# Patient Record
Sex: FEMALE | Race: BLACK OR AFRICAN AMERICAN | NOT HISPANIC OR LATINO | Employment: PART TIME | URBAN - METROPOLITAN AREA
[De-identification: names, ages, dates, MRNs, and addresses within clinical notes are randomized per-mention and may not be internally consistent; named-entity substitution may affect disease eponyms.]

---

## 2017-04-02 ENCOUNTER — HOSPITAL ENCOUNTER (EMERGENCY)
Facility: HOSPITAL | Age: 47
Discharge: HOME | End: 2017-04-02
Attending: EMERGENCY MEDICINE
Payer: MEDICAID

## 2017-04-02 VITALS
HEART RATE: 75 BPM | OXYGEN SATURATION: 96 % | SYSTOLIC BLOOD PRESSURE: 137 MMHG | BODY MASS INDEX: 26.46 KG/M2 | HEIGHT: 64 IN | TEMPERATURE: 98 F | RESPIRATION RATE: 18 BRPM | WEIGHT: 155 LBS | DIASTOLIC BLOOD PRESSURE: 75 MMHG

## 2017-04-02 DIAGNOSIS — S69.91XA HAND INJURY, RIGHT, INITIAL ENCOUNTER: Primary | ICD-10-CM

## 2017-04-02 LAB
B-HCG UR QL: NEGATIVE
CTP QC/QA: YES

## 2017-04-02 PROCEDURE — 81025 URINE PREGNANCY TEST: CPT | Performed by: EMERGENCY MEDICINE

## 2017-04-02 PROCEDURE — 63600175 PHARM REV CODE 636 W HCPCS: Performed by: NURSE PRACTITIONER

## 2017-04-02 PROCEDURE — 96372 THER/PROPH/DIAG INJ SC/IM: CPT

## 2017-04-02 PROCEDURE — 99284 EMERGENCY DEPT VISIT MOD MDM: CPT | Mod: 25

## 2017-04-02 RX ORDER — KETOROLAC TROMETHAMINE 30 MG/ML
60 INJECTION, SOLUTION INTRAMUSCULAR; INTRAVENOUS
Status: COMPLETED | OUTPATIENT
Start: 2017-04-02 | End: 2017-04-02

## 2017-04-02 RX ORDER — NAPROXEN 500 MG/1
500 TABLET ORAL 2 TIMES DAILY PRN
Qty: 30 TABLET | Refills: 0 | Status: SHIPPED | OUTPATIENT
Start: 2017-04-02 | End: 2022-04-27

## 2017-04-02 RX ADMIN — KETOROLAC TROMETHAMINE 60 MG: 30 INJECTION, SOLUTION INTRAMUSCULAR at 03:04

## 2017-04-02 NOTE — ED AVS SNAPSHOT
[facility]  [place] [address]               Di Perkins   2017  2:25 PM   ED    Description:  Female : 1970   Department:  [facility]           Your Care was Coordinated By:     Provider Role From To    Melly Calle MD Attending Provider [id number] --    Navi Lyons NP Nurse Practitioner [phone] --      Reason for Visit     Hand Injury           Diagnoses this Visit        Comments    Hand injury, right, initial encounter    -  Primary       ED Disposition     None           To Do List           Follow-up Information     Follow up with [facility] Today.    Specialty:  Emergency Medicine    Why:  If symptoms worsen, As needed    Contact information:    [address] [place] [address] [phone]        Follow up with [facility]. Schedule an appointment as soon as possible for a visit in 1 week.    Specialties:  Orthopedic Surgery, Physical Therapy    Why:  As needed, For further evaluation    Contact information:    [address] [place] [address]  [phone]         These Medications        Disp Refills Start End    naproxen (NAPROSYN) 500 MG tablet 30 tablet 0 2017     Take 1 tablet (500 mg total) by mouth 2 (two) times daily as needed (for pain). - Oral    Pharmacy: [facility] [id number] [place] [address] [address] [facility] [address] Ph #: [phone]         [facility]     [facility] Nurse Care Line - 24/ Assistance  Unless otherwise directed by your provider, please contact [facility], our nurse care line that is available for / assistance.     Registered nurses in the [facility] provide: appointment scheduling, clinical advisement, health education, and other advisory services.  Call: [phone] (toll free)               Medications           START  "taking these NEW medications        Refills    naproxen (NAPROSYN) 500 MG tablet 0    Sig: Take 1 tablet (500 mg total) by mouth 2 (two) times daily as needed (for pain).    Class: Print    Route: Oral      These medications were administered today        Dose Freq    ketorolac injection 60 mg 60 mg ED 1 Time    Sig: Inject 60 mg into the muscle ED 1 Time.    Class: Normal    Route: Intramuscular           Verify that the below list of medications is an accurate representation of the medications you are currently taking.  If none reported, the list may be blank. If incorrect, please contact your healthcare provider. Carry this list with you in case of emergency.           Current Medications     benzonatate (TESSALON) 100 MG capsule Take 1 capsule (100 mg total) by mouth 3 (three) times daily as needed for Cough.    DEXTROMETHORPHAN HBR (VICKS DAYQUIL COUGH ORAL) Take by mouth.      DM/P-EPHED/ACETAMINOPH/DOXYLAM (NYQUIL ORAL) Take by mouth.      naproxen (NAPROSYN) 500 MG tablet Take 1 tablet (500 mg total) by mouth 2 (two) times daily as needed (for pain).           Clinical Reference Information           Your Vitals Were     BP Pulse Temp Resp Height Weight    143/93 (BP Location: Right arm, Patient Position: Sitting) 79 97.9 °F (36.6 °C) (Oral) 17 5' 4" (1.626 m) 70.3 kg (155 lb)    Last Period SpO2 BMI          03/25/2017 99% 26.61 kg/m2        Allergies as of 4/2/2017     No Known Allergies      Immunizations Administered on Date of Encounter - 4/2/2017     None      ED Micro, Lab, POCT     Start Ordered       Status Ordering Provider    04/02/17 1337 04/02/17 1337  POCT urine pregnancy  Once     Comments:  For women of childbearing age w/o hysterectomy    Final result       ED Imaging Orders     Start Ordered       Status Ordering Provider    04/02/17 1338 04/02/17 1337  X-Ray Hand 3 view Right  1 time imaging      Final result         Discharge Instructions       Return to the emergency department for any " new or worsening symptoms.    Follow up with orthopedics in 1 week if pain/swelling does not improve.    Discharge References/Attachments     ACE WRAP (ENGLISH)    NAPROXEN AND NAPROXEN SODIUM ORAL IMMEDIATE-RELEASE TABLETS (ENGLISH)    R.I.C.E. (ENGLISH)      [facility] Sign-Up     Activating your [facility] account is as easy as 1-2-3!     1) Visit [email or web address], select Sign Up Now, enter this activation code and your date of birth, then select Next.  [email or web address]  Expires: 5/17/2017  3:43 PM      2) Create a username and password to use when you visit [facility] in the future and select a security question in case you lose your password and select Next.    3) Enter your e-mail address and click Sign Up!    Additional Information  If you have questions, please e-mail [email or web address] or call [phone] to talk to our [facility] staff. Remember, [facility] is NOT to be used for urgent needs. For medical emergencies, dial 911.          [facility] complies with applicable Federal civil rights laws and does not discriminate on the basis of race, color, national origin, age, disability, or sex.        Language Assistance Services     ATTENTION: Language assistance services are available, free of charge. Please call [phone].      ATENCIÓN: Si habla español, tiene a mena disposición servicios gratuitos de asistencia lingüística. Llame al [phone].     CHÚ Ý: N?u b?n nói Ti?ng Vi?t, có các d?ch v? h? tr? ngôn ng? mi?n phí dành cho b?n. G?i s? [phone].

## 2017-04-02 NOTE — ED PROVIDER NOTES
"Encounter Date: 4/2/2017       History     Chief Complaint   Patient presents with    Hand Injury     "I crushed my hand in my car door today" right.      Review of patient's allergies indicates:  No Known Allergies  HPI Comments: 46 year old female with no pertinent medical Hx c/o right hand pain and swelling after accidentally slamming it in a car door PTA. No obvious deformity or laceration. Pt has not taken any pain medications prior to arrival.    The history is provided by the patient.     History reviewed. No pertinent past medical history.  Past Surgical History:   Procedure Laterality Date    UTERINE FIBROID SURGERY       Family History   Problem Relation Age of Onset    Diabetes Mother      Social History   Substance Use Topics    Smoking status: Never Smoker    Smokeless tobacco: None    Alcohol use None     Review of Systems   Constitutional: Negative for chills and fever.   HENT: Negative for congestion, rhinorrhea, sneezing and sore throat.    Eyes: Negative for pain and discharge.   Respiratory: Negative for apnea, cough, choking, chest tightness, shortness of breath, wheezing and stridor.    Cardiovascular: Negative for chest pain, palpitations and leg swelling.   Gastrointestinal: Negative for abdominal pain, diarrhea, nausea and vomiting.   Genitourinary: Negative for dysuria and hematuria.   Musculoskeletal: Positive for arthralgias and myalgias. Negative for back pain and neck pain.   Neurological: Negative for dizziness, seizures and syncope.   Hematological: Negative for adenopathy.   Psychiatric/Behavioral: Negative for agitation. The patient is not nervous/anxious.        Physical Exam   Initial Vitals   BP Pulse Resp Temp SpO2   04/02/17 1328 04/02/17 1328 04/02/17 1328 04/02/17 1328 04/02/17 1328   143/93 79 17 97.9 °F (36.6 °C) 99 %     Physical Exam    Nursing note and vitals reviewed.  Constitutional: She appears well-developed and well-nourished. She is not diaphoretic. No " distress.   HENT:   Head: Normocephalic and atraumatic.   Right Ear: External ear normal.   Left Ear: External ear normal.   Nose: Nose normal.   Eyes: Conjunctivae and EOM are normal. Pupils are equal, round, and reactive to light. Right eye exhibits no discharge. Left eye exhibits no discharge. No scleral icterus.   Neck: Normal range of motion. Neck supple. No thyromegaly present. No tracheal deviation present. No JVD present.   Cardiovascular: Normal rate, regular rhythm, normal heart sounds and intact distal pulses. Exam reveals no gallop and no friction rub.    No murmur heard.  Pulmonary/Chest: Breath sounds normal. No stridor. No respiratory distress. She has no wheezes. She has no rhonchi. She has no rales. She exhibits no tenderness.   Abdominal: Soft. Bowel sounds are normal. She exhibits no distension and no mass. There is no tenderness. There is no rebound and no guarding.   Musculoskeletal: She exhibits edema and tenderness.        Right hand: She exhibits decreased range of motion (right thumb secondary to pain), tenderness (right thenar eminence) and swelling (right thenar eminence). She exhibits normal two-point discrimination, normal capillary refill, no deformity and no laceration. Normal sensation noted.   Lymphadenopathy:     She has no cervical adenopathy.   Neurological: She is alert and oriented to person, place, and time. She has normal strength and normal reflexes. She displays normal reflexes. No cranial nerve deficit or sensory deficit.   Skin: Skin is warm and dry. No rash and no abscess noted. No erythema. No pallor.   Psychiatric: She has a normal mood and affect. Her behavior is normal. Judgment and thought content normal.         ED Course   Procedures  Labs Reviewed   POCT URINE PREGNANCY             Medical Decision Making:   ED Management:  This is a 46 year old female with no pertinent medial Hx presenting with right hand pain and swelling secondary to accidentally closing the  hand in her car door prior to arrival today. Pt has not taken any medications for pain control. On exam there is no laceration or deformity. All five digits of the right hand are neurovascularly in tact. There is swelling to right thenar eminence. ROM of right thumb limited secondary to pain. Xray of the right hand reviewed by me shows no acute fracture or dislocation. I suspect right hand injury. I considered but doubt facture, dislocation. ACE wrap applied and Toradol IM administered in the ED. Pt discharged with prescription for Naproxen and instructions to follow up with  Ortho. Pt discharged home with instructions for follow up and supportive care. ED return precautions given. Pt verbalized understanding of all information and instructions given. This case was discussed with Melly Calle MD who agreed with the assessment and plan.    Other:   I have discussed this case with another health care provider.                   ED Course     Clinical Impression:   The encounter diagnosis was Hand injury, right, initial encounter.    Disposition:   Disposition: Discharged  Condition: Stable       Navi Lyons NP  04/02/17 6048

## 2017-04-02 NOTE — DISCHARGE INSTRUCTIONS
Return to the emergency department for any new or worsening symptoms.    Follow up with orthopedics in 1 week if pain/swelling does not improve.

## 2018-01-04 ENCOUNTER — OFFICE VISIT (OUTPATIENT)
Dept: OBSTETRICS AND GYNECOLOGY | Facility: CLINIC | Age: 48
End: 2018-01-04
Payer: MEDICAID

## 2018-01-04 ENCOUNTER — LAB VISIT (OUTPATIENT)
Dept: LAB | Facility: HOSPITAL | Age: 48
End: 2018-01-04
Attending: OBSTETRICS & GYNECOLOGY
Payer: MEDICAID

## 2018-01-04 VITALS
HEIGHT: 64 IN | SYSTOLIC BLOOD PRESSURE: 112 MMHG | DIASTOLIC BLOOD PRESSURE: 64 MMHG | WEIGHT: 172.94 LBS | BODY MASS INDEX: 29.52 KG/M2

## 2018-01-04 DIAGNOSIS — N93.9 ABNORMAL UTERINE BLEEDING (AUB): ICD-10-CM

## 2018-01-04 DIAGNOSIS — Z12.31 ENCOUNTER FOR SCREENING MAMMOGRAM FOR BREAST CANCER: ICD-10-CM

## 2018-01-04 DIAGNOSIS — Z01.419 ENCOUNTER FOR WELL WOMAN EXAM WITH ROUTINE GYNECOLOGICAL EXAM: Primary | ICD-10-CM

## 2018-01-04 DIAGNOSIS — Z12.4 ENCOUNTER FOR PAPANICOLAOU SMEAR FOR CERVICAL CANCER SCREENING: ICD-10-CM

## 2018-01-04 LAB
BASOPHILS # BLD AUTO: 0.01 K/UL
BASOPHILS NFR BLD: 0.2 %
DIFFERENTIAL METHOD: ABNORMAL
EOSINOPHIL # BLD AUTO: 0.2 K/UL
EOSINOPHIL NFR BLD: 5.3 %
ERYTHROCYTE [DISTWIDTH] IN BLOOD BY AUTOMATED COUNT: 13.5 %
FSH SERPL-ACNC: 47.5 MIU/ML
HCG INTACT+B SERPL-ACNC: <1.2 MIU/ML
HCT VFR BLD AUTO: 37.4 %
HGB BLD-MCNC: 12 G/DL
LH SERPL-ACNC: 25.3 MIU/ML
LYMPHOCYTES # BLD AUTO: 1.9 K/UL
LYMPHOCYTES NFR BLD: 41.4 %
MCH RBC QN AUTO: 27.6 PG
MCHC RBC AUTO-ENTMCNC: 32.1 G/DL
MCV RBC AUTO: 86 FL
MONOCYTES # BLD AUTO: 0.2 K/UL
MONOCYTES NFR BLD: 5.1 %
NEUTROPHILS # BLD AUTO: 2.2 K/UL
NEUTROPHILS NFR BLD: 47.8 %
PLATELET # BLD AUTO: 217 K/UL
PMV BLD AUTO: 10 FL
PROLACTIN SERPL IA-MCNC: 7.6 NG/ML
RBC # BLD AUTO: 4.35 M/UL
T4 FREE SERPL-MCNC: 1.03 NG/DL
TSH SERPL DL<=0.005 MIU/L-ACNC: 1.44 UIU/ML
WBC # BLD AUTO: 4.52 K/UL

## 2018-01-04 PROCEDURE — 88175 CYTOPATH C/V AUTO FLUID REDO: CPT

## 2018-01-04 PROCEDURE — 87624 HPV HI-RISK TYP POOLED RSLT: CPT

## 2018-01-04 PROCEDURE — 84702 CHORIONIC GONADOTROPIN TEST: CPT

## 2018-01-04 PROCEDURE — 99202 OFFICE O/P NEW SF 15 MIN: CPT | Mod: 25,S$PBB,, | Performed by: OBSTETRICS & GYNECOLOGY

## 2018-01-04 PROCEDURE — 36415 COLL VENOUS BLD VENIPUNCTURE: CPT

## 2018-01-04 PROCEDURE — 99213 OFFICE O/P EST LOW 20 MIN: CPT | Mod: PBBFAC | Performed by: OBSTETRICS & GYNECOLOGY

## 2018-01-04 PROCEDURE — 83001 ASSAY OF GONADOTROPIN (FSH): CPT

## 2018-01-04 PROCEDURE — 99999 PR PBB SHADOW E&M-EST. PATIENT-LVL III: CPT | Mod: PBBFAC,,, | Performed by: OBSTETRICS & GYNECOLOGY

## 2018-01-04 PROCEDURE — 84439 ASSAY OF FREE THYROXINE: CPT

## 2018-01-04 PROCEDURE — 83002 ASSAY OF GONADOTROPIN (LH): CPT

## 2018-01-04 PROCEDURE — 84146 ASSAY OF PROLACTIN: CPT

## 2018-01-04 PROCEDURE — 85025 COMPLETE CBC W/AUTO DIFF WBC: CPT

## 2018-01-04 PROCEDURE — 84443 ASSAY THYROID STIM HORMONE: CPT

## 2018-01-04 PROCEDURE — 99386 PREV VISIT NEW AGE 40-64: CPT | Mod: S$PBB,,, | Performed by: OBSTETRICS & GYNECOLOGY

## 2018-01-04 NOTE — PROGRESS NOTES
SUBJECTIVE:   47 y.o. female   for AUB and WWE    SUBJECTIVE:   Di Perkins is a 47 y.o. female   for annual well woman exam. Patient's last menstrual period was 2017 (exact date)..  She complains of aub, see note below.      Contraception: none    History reviewed. No pertinent past medical history.  Past Surgical History:   Procedure Laterality Date    UTERINE FIBROID SURGERY       Social History     Social History    Marital status:      Spouse name: N/A    Number of children: N/A    Years of education: N/A     Occupational History    Not on file.     Social History Main Topics    Smoking status: Never Smoker    Smokeless tobacco: Never Used    Alcohol use No    Drug use: No    Sexual activity: Yes     Partners: Male     Birth control/ protection: None     Other Topics Concern    Not on file     Social History Narrative    No narrative on file     Family History   Problem Relation Age of Onset    Diabetes Mother      OB History    Para Term  AB Living   3 1   1   2   SAB TAB Ectopic Multiple Live Births                  # Outcome Date GA Lbr Erwin/2nd Weight Sex Delivery Anes PTL Lv   3             2             1                Obstetric Comments   Gynhx: reg/5. Not heavy. Mild cramp   Sexually active but not using protection   H/o myomectomy in    H/o abnl pap, did not require treatment. Last pap 2015 neg   Denies STD         Current Outpatient Prescriptions   Medication Sig Dispense Refill    benzonatate (TESSALON) 100 MG capsule Take 1 capsule (100 mg total) by mouth 3 (three) times daily as needed for Cough. 20 capsule 0    DEXTROMETHORPHAN HBR (VICKS DAYQUIL COUGH ORAL) Take by mouth.        DM/P-EPHED/ACETAMINOPH/DOXYLAM (NYQUIL ORAL) Take by mouth.        naproxen (NAPROSYN) 500 MG tablet Take 1 tablet (500 mg total) by mouth 2 (two) times daily as needed (for pain). 30 tablet 0     No current facility-administered  "medications for this visit.      Allergies: Patient has no known allergies.       ROS:  GENERAL: Denies weight gain or weight loss. Feeling well overall.   SKIN: Denies rash or lesions.   HEAD: Denies head injury or headache.   NODES: Denies enlarged lymph nodes.   CHEST: Denies chest pain or shortness of breath.   CARDIOVASCULAR: Denies palpitations or left sided chest pain.   ABDOMEN: No abdominal pain, constipation, diarrhea, nausea, vomiting or rectal bleeding.   URINARY: No frequency, dysuria, hematuria, or burning on urination.  REPRODUCTIVE: menstrual irregularities  BREASTS: The patient performs breast self-examination and denies pain, lumps, or nipple discharge.   HEMATOLOGIC: No easy bruisability or excessive bleeding.  MUSCULOSKELETAL: Denies joint pain or swelling.   NEUROLOGIC: Denies syncope or weakness.   PSYCHIATRIC: Denies depression, anxiety or mood swings.      OBJECTIVE:   /64   Ht 5' 4" (1.626 m)   Wt 78.4 kg (172 lb 15.2 oz)   LMP 12/18/2017 (Exact Date)   BMI 29.69 kg/m²   The patient appears well, alert, oriented x 3, in no distress.  NECK: negative, no thyromegaly, trachea midline  SKIN: normal, good color, good turgor and no acne, striae, hirsutism  BREAST EXAM: breasts appear normal, no suspicious masses, no skin or nipple changes or axillary nodes  ABDOMEN: soft, non-tender; bowel sounds normal; no masses,  no organomegaly and no hernias, masses, or hepatosplenomegaly  GENITALIA: normal external genitalia, no erythema, no discharge  URETHRA: normal appearing urethra with no masses, tenderness or lesions and normal urethra, normal urethral meatus  VAGINA: Normal  CERVIX: no cervical motion tenderness. Nabothian cyst noted at 12 oclock.  Moderate bleeding noted with pap smear  UTERUS: enlarged, 10 weeks size and regular contour  ADNEXA: no mass, fullness, tenderness      ASSESSMENT:   1. Health maintenance  -pap done. Discussed ASCCP guideline screening every 3 - 5 years. " "  -screening MMG ordered  -counseled on exercise and healthy diet, weight loss  -bone health:  Discussed Vitamin D and Calcium supplementation, weight bearing exercises      Progress NOTE    Patient's last menstrual period was 2017 (exact date)..      Her normal cycles are monthly and lasting 5 days. Normal flow, not heavy.  Pt reported LMP on 17, heavy for the first 3 days were heavier than usual. Changed 8 pads in a day.  Went through a whole box of pads in 3 days.  She then continues to vaginal spotting everyday until yesterday.  + discomfort in the abdomen as if something is pulling.  Never had this problem before.  Never had any IMB before this episode   Denies postcoital bleeding     H/o uterine fibroid, s/p myomectomy in  before her pregnancies    OB History    Para Term  AB Living   3 1   1   2   SAB TAB Ectopic Multiple Live Births                  # Outcome Date GA Lbr Erwin/2nd Weight Sex Delivery Anes PTL Lv   3             2             1                Obstetric Comments   Gynhx: reg/5. Not heavy. Mild cramp   Sexually active but not using protection   H/o myomectomy in    H/o abnl pap, did not require treatment. Last pap 2015 neg   Denies STD       OBJECTIVE:   /64   Ht 5' 4" (1.626 m)   Wt 78.4 kg (172 lb 15.2 oz)   LMP 2017 (Exact Date)   BMI 29.69 kg/m²   The patient appears well, alert, oriented x 3, in no distress.  See exam as above    ASSESSMENT:   1.  AUB:  Check TSH, LH, FSH, UPT, Prolactin and pelvic US.  Discussed with patient different etiology of abnormal uterine bleeding and different management approaches depend on the causes.  Patient will need to return for an EMB.   2. H/o fibroid:  Discussed with pt this could be the cause of her AUB as fibroid can recurs.  3.  RTC in one week for EMB  "

## 2018-01-09 LAB
HPV16 AG SPEC QL: NEGATIVE
HPV16+18+H RISK 12 DNA CVX-IMP: NEGATIVE
HPV18 DNA SPEC QL NAA+PROBE: NEGATIVE

## 2018-01-10 ENCOUNTER — HOSPITAL ENCOUNTER (OUTPATIENT)
Dept: RADIOLOGY | Facility: HOSPITAL | Age: 48
Discharge: HOME | End: 2018-01-10
Attending: OBSTETRICS & GYNECOLOGY
Payer: MEDICAID

## 2018-01-10 ENCOUNTER — PROCEDURE VISIT (OUTPATIENT)
Dept: OBSTETRICS AND GYNECOLOGY | Facility: CLINIC | Age: 48
End: 2018-01-10
Payer: MEDICAID

## 2018-01-10 VITALS
BODY MASS INDEX: 29.84 KG/M2 | HEIGHT: 64 IN | WEIGHT: 174.81 LBS | SYSTOLIC BLOOD PRESSURE: 110 MMHG | DIASTOLIC BLOOD PRESSURE: 64 MMHG

## 2018-01-10 DIAGNOSIS — N93.9 ABNORMAL UTERINE BLEEDING (AUB): ICD-10-CM

## 2018-01-10 DIAGNOSIS — N93.9 ABNORMAL UTERINE BLEEDING (AUB): Primary | ICD-10-CM

## 2018-01-10 PROBLEM — D25.9 UTERINE FIBROID: Status: ACTIVE | Noted: 2018-01-10

## 2018-01-10 LAB
B-HCG UR QL: NEGATIVE
CTP QC/QA: YES

## 2018-01-10 PROCEDURE — 76830 TRANSVAGINAL US NON-OB: CPT | Mod: TC

## 2018-01-10 PROCEDURE — 76856 US EXAM PELVIC COMPLETE: CPT | Mod: 26,,, | Performed by: RADIOLOGY

## 2018-01-10 PROCEDURE — 99499 UNLISTED E&M SERVICE: CPT | Mod: S$PBB,,, | Performed by: OBSTETRICS & GYNECOLOGY

## 2018-01-10 PROCEDURE — 76830 TRANSVAGINAL US NON-OB: CPT | Mod: 26,,, | Performed by: RADIOLOGY

## 2018-01-10 PROCEDURE — 88305 TISSUE EXAM BY PATHOLOGIST: CPT | Mod: 26,,, | Performed by: PATHOLOGY

## 2018-01-10 PROCEDURE — 81025 URINE PREGNANCY TEST: CPT | Mod: PBBFAC | Performed by: OBSTETRICS & GYNECOLOGY

## 2018-01-10 PROCEDURE — 88305 TISSUE EXAM BY PATHOLOGIST: CPT | Performed by: PATHOLOGY

## 2018-01-10 PROCEDURE — 58100 BIOPSY OF UTERUS LINING: CPT | Mod: S$PBB,,, | Performed by: OBSTETRICS & GYNECOLOGY

## 2018-01-10 PROCEDURE — 58100 BIOPSY OF UTERUS LINING: CPT | Mod: PBBFAC | Performed by: OBSTETRICS & GYNECOLOGY

## 2018-01-10 NOTE — PROGRESS NOTES
Please inform pt of normal pap.  Pelvic US shows at least 4 fibroids. Largest 4 cm.  Will await EMBx result for further treatment plan.

## 2018-01-10 NOTE — PROCEDURES
Procedures     CC: ENDOMETRIAL BIOPSPY    Di Perkins is a 47 y.o. female  presents for an endometrial biopsy secondary to AUB-prolonged menstrual bleeding.  UPT is Negative.      Pelvic US : not done, scheduled for today    PRE-ENDOMETRIAL BIOPSY COUNSELING:    The patient was informed of the risk of bleeding, infection, uterine perforation and pain and that the test will rule-out endometrial cancer with accuracy greater than 95%.  She was counseled on the alternatives to endometrial biopsy and agrees to proceed.      TIME OUT PERFORMED.    The cervix was visualized with a speculum.    The cervix was prepped with iodine.    A single tooth tenaculum was placed on the anterior lip prior to the biopsy.      A sterile endometrial pipelle was passed without difficulty to a depth of 10 cm.    Endometrial tissue was obtained.      The specimen was placed in formalyn and sent to Pathology of histology evaluation.    The patient tolerated the procedure well.      ASSESSMENT AND PLAN  AUB:  Pelvic US pending.  Will f/u on EMB result  FSH is high - likely due to perimenopausal transition  Will call pt for result    POST ENDOMETRIAL BIOPSY COUNSELING:  Manage post biopsy cramping with NSAIDs or Tylenol.  Expect spotting or light bleeding for a few days.  Report bleeding heavier than a period, fever > 101.0 F, worsening pain or a foul smelling vaginal discharge.      Counseling lasted approximately 15 minutes and all her questions were answered.      FOLLOW-UP:  Pending on result

## 2018-01-12 ENCOUNTER — TELEPHONE (OUTPATIENT)
Dept: OBSTETRICS AND GYNECOLOGY | Facility: CLINIC | Age: 48
End: 2018-01-12

## 2018-01-12 NOTE — TELEPHONE ENCOUNTER
----- Message from Mercedes Michelle MD sent at 1/12/2018  2:15 PM CST -----  Called pt to discuss US result as well as EMB result.  VM not set up on mobile phone.  Message left on home phone for pt to call back.  Please try to call pt again.      To discuss:  Negative EMB result  Pelvic US with 4 fibroids  Will discuss treatment plan.

## 2018-01-12 NOTE — TELEPHONE ENCOUNTER
Called and spoke with pt regarding results of ultrasound and EMBX.  An appt made for pt to come in on 01/15/2018.

## 2018-01-15 ENCOUNTER — OFFICE VISIT (OUTPATIENT)
Dept: OBSTETRICS AND GYNECOLOGY | Facility: CLINIC | Age: 48
End: 2018-01-15
Payer: MEDICAID

## 2018-01-15 VITALS
SYSTOLIC BLOOD PRESSURE: 116 MMHG | DIASTOLIC BLOOD PRESSURE: 60 MMHG | BODY MASS INDEX: 28.22 KG/M2 | HEIGHT: 66 IN | WEIGHT: 175.63 LBS

## 2018-01-15 DIAGNOSIS — D25.9 UTERINE LEIOMYOMA, UNSPECIFIED LOCATION: Primary | ICD-10-CM

## 2018-01-15 DIAGNOSIS — D68.51 FACTOR 5 LEIDEN MUTATION, HETEROZYGOUS: ICD-10-CM

## 2018-01-15 PROCEDURE — 99999 PR PBB SHADOW E&M-EST. PATIENT-LVL III: CPT | Mod: PBBFAC,,, | Performed by: OBSTETRICS & GYNECOLOGY

## 2018-01-15 PROCEDURE — 99213 OFFICE O/P EST LOW 20 MIN: CPT | Mod: PBBFAC | Performed by: OBSTETRICS & GYNECOLOGY

## 2018-01-15 PROCEDURE — 99213 OFFICE O/P EST LOW 20 MIN: CPT | Mod: S$PBB,,, | Performed by: OBSTETRICS & GYNECOLOGY

## 2018-01-15 NOTE — PROGRESS NOTES
SUBJECTIVE:   47 y.o. female   for result discussion    Patient's last menstrual period was 2017 (exact date)..    Pt had workup for heavy prolonged menstrual bleeding.  EMB was negative.  FSH elevated and pelvic US shows uterine fibroid       EMB 2018  Specimen submitted as endometrium biopsy:  -Fragments of very weakly proliferative endometrium exhibiting slight glandular-stromal dyssynchrony  -Glands appear evenly spaced and are free of hyperplasia and atypia  -Clinical correlation is necessary for complete interpretation.    Time of Procedure: 01/10/18 15:34:55  Accession # [id number]    Technique: Transabdominal and transvaginal ultrasound of the pelvis with color flow Doppler evaluation of the ovaries.    Comparison: None.    Clinical indication:  Abnormal uterine bleeding.    Findings:    Uterus measures 7.0 x 6.4 x 7.3cm with at least 4 uterine fibroids.  Uterine fibroid in the anterior fundus measuring 3.6 x 3.3 x 3.1 CM.  Uterine fibroid in the mid superior body measuring 1.6 x 1.0 x 1.6 CM.  Uterine fibroid and the mid superior uterine body measuring 1.8 x 2.2 x 1.5 CM.  Uterine fibroid in the anterior uterine body measuring 3.2 x 3.3 x 3.1 CM.  The endometrial stripe is not thickened in this pre-menopausal patient, measuring 12mm.    The ovaries are normal in size and appearance.    Right ovary measures 2.0 x 1.8 x 1.9cm.  There is normal vascular flow.    Left ovary measures 2.1 x 2.3 x 2.1cm.  There is normal vascular flow.    There is no evidence of free fluid within the adnexa, likely physiologic.   Impression       Uterine fibroids as described above.  Electronically signed by: EVER CAMPBELL MD  Date: 01/10/18  Time: 16:25          OB History    Para Term  AB Living   3 1   1   2   SAB TAB Ectopic Multiple Live Births                  # Outcome Date GA Lbr Erwin/2nd Weight Sex Delivery Anes PTL Lv   3             2             1                 Obstetric Comments   Gynhx: reg/5. Not heavy. Mild cramp   Sexually active but not using protection   H/o myomectomy in 2000   H/o abnl pap, did not require treatment. Last pap 8/2015 neg   Denies STD     Past Medical History:   Diagnosis Date    Deep vein thrombosis 2002    left forearm, due to Factor 5 Leiden    Factor 5 Leiden mutation, heterozygous      Past Surgical History:   Procedure Laterality Date    UTERINE FIBROID SURGERY       Social History     Social History    Marital status:      Spouse name: N/A    Number of children: N/A    Years of education: N/A     Occupational History    Not on file.     Social History Main Topics    Smoking status: Never Smoker    Smokeless tobacco: Never Used    Alcohol use No    Drug use: No    Sexual activity: Yes     Partners: Male     Birth control/ protection: None     Other Topics Concern    Not on file     Social History Narrative    No narrative on file     Family History   Problem Relation Age of Onset    Diabetes Mother          Current Outpatient Prescriptions   Medication Sig Dispense Refill    benzonatate (TESSALON) 100 MG capsule Take 1 capsule (100 mg total) by mouth 3 (three) times daily as needed for Cough. 20 capsule 0    DEXTROMETHORPHAN HBR (VICKS DAYQUIL COUGH ORAL) Take by mouth.        DM/P-EPHED/ACETAMINOPH/DOXYLAM (NYQUIL ORAL) Take by mouth.        naproxen (NAPROSYN) 500 MG tablet Take 1 tablet (500 mg total) by mouth 2 (two) times daily as needed (for pain). 30 tablet 0     No current facility-administered medications for this visit.      Allergies: Patient has no known allergies.       ROS:  GENERAL: Denies weight gain or weight loss. Feeling well overall.   SKIN: Denies rash or lesions.   HEAD: Denies head injury or headache.   NODES: Denies enlarged lymph nodes.   CHEST: Denies chest pain or shortness of breath.   CARDIOVASCULAR: Denies palpitations or left sided chest pain.   ABDOMEN: No abdominal pain,  "constipation, diarrhea, nausea, vomiting or rectal bleeding.   URINARY: No frequency, dysuria, hematuria, or burning on urination.  REPRODUCTIVE: Denies vaginal discharge, abnormal vaginal bleeding, lesions, pelvic pain  BREASTS: The patient performs breast self-examination and denies pain, lumps, or nipple discharge.   HEMATOLOGIC: No easy bruisability or excessive bleeding.  MUSCULOSKELETAL: Denies joint pain or swelling.   NEUROLOGIC: Denies syncope or weakness.   PSYCHIATRIC: Denies depression, anxiety or mood swings.    OBJECTIVE:   /60   Ht 5' 6" (1.676 m)   Wt 79.6 kg (175 lb 9.5 oz)   LMP 12/18/2017 (Exact Date)   BMI 28.34 kg/m²   The patient appears well, alert, oriented x 3, in no distress.    Counseling appt only    ASSESSMENT:   1.  AUB-O/L:  Due to ovulatory dysfunction as well as uterine fibroid.  Pt with h/o DVT due to Factor 5 leiden.  She is not a candidate for SHANELLE due to F5L deficiency.  Discussed with pt that progesterone only medication such as mirena IUD, nexplanon, mini-ill or depo provera.  Pt would like to monitor her bleeding for now.  Advised pt to take motrin 600 mg q 6 hours around her cycle for decrease menstrual flow as well as dysmenorrhea.  All questions were answered to her satisfaction.    Total time spent with patient 15 minutes, with >50% spent on counseling and coordinating of care.     "

## 2018-05-16 ENCOUNTER — HOSPITAL ENCOUNTER (EMERGENCY)
Facility: HOSPITAL | Age: 48
Discharge: HOME | End: 2018-05-16
Attending: EMERGENCY MEDICINE
Payer: MEDICAID

## 2018-05-16 VITALS
DIASTOLIC BLOOD PRESSURE: 75 MMHG | TEMPERATURE: 98 F | RESPIRATION RATE: 18 BRPM | SYSTOLIC BLOOD PRESSURE: 117 MMHG | HEIGHT: 65 IN | BODY MASS INDEX: 27.49 KG/M2 | WEIGHT: 165 LBS | OXYGEN SATURATION: 97 % | HEART RATE: 72 BPM

## 2018-05-16 DIAGNOSIS — M25.461 EFFUSION, RIGHT KNEE: Primary | ICD-10-CM

## 2018-05-16 DIAGNOSIS — M25.569 KNEE PAIN: ICD-10-CM

## 2018-05-16 DIAGNOSIS — M79.606 LEG PAIN: ICD-10-CM

## 2018-05-16 LAB
B-HCG UR QL: NEGATIVE
CTP QC/QA: YES

## 2018-05-16 PROCEDURE — 25000003 PHARM REV CODE 250: Performed by: PHYSICIAN ASSISTANT

## 2018-05-16 PROCEDURE — 81025 URINE PREGNANCY TEST: CPT | Performed by: NURSE PRACTITIONER

## 2018-05-16 PROCEDURE — 99284 EMERGENCY DEPT VISIT MOD MDM: CPT | Mod: 25

## 2018-05-16 RX ORDER — NAPROXEN 500 MG/1
500 TABLET ORAL
Status: COMPLETED | OUTPATIENT
Start: 2018-05-16 | End: 2018-05-16

## 2018-05-16 RX ADMIN — NAPROXEN 500 MG: 500 TABLET ORAL at 02:05

## 2018-05-16 NOTE — DISCHARGE INSTRUCTIONS
Please rest, apply ice intermittently, compress with ace wrap and elevate your leg as much as possible.    Avoid strenuous activity.    You can take Naproxen as needed for pain.    Please make a follow-up appointment with an orthopedist if your symptoms continue.    Return to the ER for any new or concerning symptoms.

## 2018-05-16 NOTE — ED PROVIDER NOTES
Encounter Date: 5/16/2018    This is a SORT/MSE of a 47 y.o. female presenting to the ED with c/o knee pain and swelling - right knee. Also reports swelling to the calf. Pain with ambulation. No injuries. No TTP of the popliteal fossa or calf. Takes ASA daily. Care will be transferred to an alternate provider when patient is roomed for a full evaluation and final disposition. SHEBA Castellanos, FNP-C    SCRIBE #1 NOTE: Mo BLANCO am scribing for, and in the presence of,  Danette Gonzalez PA-C. I have scribed the following portions of the note - Other sections scribed: HPI, ROS.       History     Chief Complaint   Patient presents with    Joint Swelling     right knee swelling that started sunday. Initially started at the end of april. Has pain in the back of calf     CC: Knee Pain and Swelling    46 y/o female with DVT of L forearm presents to the ED c/o acute onset R sided knee swelling and pain that radiates down the posterior aspect of R sided calf to R sided ankle that started x3 days ago. The patient reports similar symptoms x2 wks ago that resolved on its own. The pain is severe (8/10). Ambulation and extension of R leg worsen the pain. The patient attempted Ibuprofen (last dose yesterday) and aspirin (last dose x3 days ago) with no relief. The patient denies any recent injury or trauma. The patient denies hx of knee injury. The patient denies numbness/tingling, fever, or cough. No other symptoms reported.      The history is provided by the patient. No  was used.     Review of patient's allergies indicates:  No Known Allergies  Past Medical History:   Diagnosis Date    Deep vein thrombosis 2002    left forearm, due to Factor 5 Leiden    Factor 5 Leiden mutation, heterozygous      Past Surgical History:   Procedure Laterality Date    UTERINE FIBROID SURGERY       Family History   Problem Relation Age of Onset    Diabetes Mother      Social History   Substance Use Topics     Smoking status: Never Smoker    Smokeless tobacco: Never Used    Alcohol use No     Review of Systems   Constitutional: Negative for chills, diaphoresis, fatigue and fever.   HENT: Negative for congestion, ear pain, rhinorrhea and sore throat.    Eyes: Negative for pain and redness.   Respiratory: Negative for cough and shortness of breath.    Cardiovascular: Negative for chest pain.   Gastrointestinal: Negative for abdominal pain, constipation, diarrhea, nausea and vomiting.   Genitourinary: Negative for decreased urine volume, difficulty urinating, dysuria, frequency, hematuria and urgency.   Musculoskeletal: Positive for arthralgias (right knee pain). Negative for back pain, joint swelling and neck pain.        (+) R sided knee swelling and pain that radiates down the posterior aspect of R sided calf to R side ankle   Skin: Negative for rash.   Neurological: Negative for numbness (or tingling) and headaches.   Psychiatric/Behavioral: Negative for confusion. The patient is not nervous/anxious.        Physical Exam     Initial Vitals [05/16/18 1134]   BP Pulse Resp Temp SpO2   136/78 83 18 98.3 °F (36.8 °C) 98 %      MAP       97.33         Physical Exam    Nursing note and vitals reviewed.  Constitutional: Vital signs are normal. She appears well-developed and well-nourished. She is not diaphoretic. She is cooperative.  Non-toxic appearance. She does not have a sickly appearance. She does not appear ill. No distress.   HENT:   Head: Normocephalic and atraumatic.   Right Ear: Tympanic membrane, external ear and ear canal normal.   Left Ear: Tympanic membrane, external ear and ear canal normal.   Nose: Nose normal.   Mouth/Throat: Uvula is midline, oropharynx is clear and moist and mucous membranes are normal. No oropharyngeal exudate.   Eyes: Conjunctivae, EOM and lids are normal. Pupils are equal, round, and reactive to light.   Neck: Trachea normal, normal range of motion, full passive range of motion without  pain and phonation normal. Neck supple.   Cardiovascular: Normal rate, regular rhythm, normal heart sounds and intact distal pulses. Exam reveals no gallop and no friction rub.    No murmur heard.  Pulses:       Popliteal pulses are 2+ on the right side, and 2+ on the left side.   Capillary refill less than 2 sec in bilateral lower extremities.   Pulmonary/Chest: Effort normal and breath sounds normal. No respiratory distress. She has no decreased breath sounds. She has no wheezes. She has no rhonchi. She has no rales.   Abdominal: Soft. Normal appearance and bowel sounds are normal. She exhibits no distension and no mass. There is no tenderness. There is no rigidity, no rebound and no guarding.   Musculoskeletal: She exhibits tenderness. She exhibits no edema.        Right knee: She exhibits normal range of motion, no swelling, no effusion, no ecchymosis, no deformity, no laceration, no erythema, normal alignment, no LCL laxity, normal patellar mobility, no bony tenderness, normal meniscus and no MCL laxity.        Left knee: Normal.        Right ankle: Normal.        Left ankle: Normal.        Right lower leg: Normal.        Left lower leg: Normal.        Right foot: Normal.        Left foot: Normal.   There is tenderness to palpation of the right knee at the suprapatellar region.  There is no obvious deformity.  There is no obvious effusion.  Patient has full range of motion of bilateral lower extremities.  Patient ambulates with a limp secondary to the pain of her right knee.  Negative Krishna sign.  There is no swelling of the lower extremities bilaterally.   Neurological: She is alert and oriented to person, place, and time. She has normal strength. No cranial nerve deficit or sensory deficit. She exhibits normal muscle tone. Coordination and gait normal. GCS eye subscore is 4. GCS verbal subscore is 5. GCS motor subscore is 6.   Skin: Skin is warm and dry. Capillary refill takes less than 2 seconds. No rash and  no abscess noted. No erythema. No pallor.   Psychiatric: She has a normal mood and affect. Her speech is normal and behavior is normal. Judgment and thought content normal. Cognition and memory are normal.         ED Course   Procedures  Labs Reviewed   POCT URINE PREGNANCY        Imaging Results          US Lower Extremity Veins Right (Final result)  Result time 05/16/18 13:49:06    Final result by Ramsey Nieto MD (05/16/18 13:49:06)                 Impression:      Negative right lower extremity DVT study.    Simple appearing fluid collection within the soft tissues of the anterior knee.      Electronically signed by: Ramsey Nieto MD  Date:    05/16/2018  Time:    13:49             Narrative:    EXAMINATION:  US LOWER EXTREMITY VEINS RIGHT    CLINICAL HISTORY:  Pain in leg, unspecified    TECHNIQUE:  Duplex and color flow Doppler evaluation and graded compression of the right lower extremity veins was performed.    FINDINGS:  The duplex and color flow Doppler evaluation does not reveal any evidence of deep venous thrombosis within the right common femoral, femoral, popliteal, peroneal, posterior tibial, and anterior tibial veins.  There is a 6.4 x 1 x 5.4 cm simple appearing fluid collection within the soft tissues of the anterior knee.                               X-Ray Knee 3 View Right (Final result)  Result time 05/16/18 12:03:33    Final result by Harshil Pryor MD (05/16/18 12:03:33)                 Impression:      1. Small suprapatellar effusion, no acute displaced fracture or dislocation of the knee.      Electronically signed by: Harshil Pryor MD  Date:    05/16/2018  Time:    12:03             Narrative:    EXAMINATION:  XR KNEE 3 VIEW RIGHT    CLINICAL HISTORY:  Pain in unspecified knee    TECHNIQUE:  AP, lateral, and Merchant views of the right knee were performed.    COMPARISON:  None    FINDINGS:  Three views.    No acute displaced fracture or dislocation of the knee.  There is a small  suprapatellar effusion.  No radiopaque foreign body.                                       APC / Resident Notes:   This is an evaluation of a 47 y.o. female that presents to the Emergency Department for joint swelling. Patient reports atraumatic right knee pain x 3 days.  She reports a sharp pain that is worsened with movement.  She reports swelling of the right lower extremity with pain of the calf. She denies any numbness, tingling, color change, rashes or further symptoms. She denies any injury or trauma.  She denies any attempted treatment prior to arrival.     Physical Exam shows a non-toxic, afebrile, and well appearing female.  There is tenderness to palpation of the suprapatellar region of the right knee.  There is no obvious deformity.  There is no obvious effusion.  Patient ambulates without assistance.  Sensation and strength intact.  Peripheral pulses intact.  Compartments soft.  Negative Homans sign. Full range of motion of bilateral lower extremities on exam.    Vital Signs Are Reassuring. If available, previous records reviewed.   RESULTS:  UPT negative.  Ultrasound right lower extremity veins negative for DVT.  X-ray shows small suprapatellar effusion; no acute displaced fracture or dislocation.    My overall impression is effusion of right knee and right knee pain.  Ddx: knee pain, effusion, fracture, dislocation, bursitis    ED Course: Naproxen, ice, ace wrap.  I feel this patient is stable for discharge. I will recommend rice therapy and NSAIDs as needed for pain.  I will recommend follow up with orthopedics for further evaluation and treatment. The diagnosis, treatment plan, instructions for follow-up and reevaluation with orthopedics, as well as ED return precautions were discussed and understanding was verbalized. All questions or concerns have been addressed. Patient was discharged home with an instructional sheet which gave not only information regarding the most likely diagnoses but also  information regarding when to return to the emergency department for alarming symptoms and when to seek further care.      This case was discussed with Dr. Kan who is in agreement with my assessment and plan.     Danette Tirado PA-C           Scribe Attestation:   Scribe #1: I performed the above scribed service and the documentation accurately describes the services I performed. I attest to the accuracy of the note.    Attending Attestation:     Physician Attestation Statement for NP/PA:   I discussed this assessment and plan of this patient with the NP/PA, but I did not personally examine the patient. The face to face encounter was performed by the NP/PA.        Physician Attestation for Scribe:  Physician Attestation Statement for Scribe #1: I, Danette Gonzalez PA-C, reviewed documentation, as scribed by Mo Abreu in my presence, and it is both accurate and complete.                    Clinical Impression:   The primary encounter diagnosis was Effusion, right knee. Diagnoses of Knee pain and Leg pain were also pertinent to this visit.    Disposition:   Disposition: Discharged  Condition: Stable                        Danette Tirado PA-C  05/16/18 2015

## 2018-05-16 NOTE — ED TRIAGE NOTES
"C/o rt. Knee swelling and pain "weeks ago but came back on Sunday". Denies injury a few weeks ago.  Ice applied, IBU taken.  IBU taken 06:45.   "

## 2020-06-02 ENCOUNTER — OFFICE VISIT (OUTPATIENT)
Dept: OBSTETRICS AND GYNECOLOGY | Facility: CLINIC | Age: 50
End: 2020-06-02
Payer: MEDICAID

## 2020-06-02 VITALS
WEIGHT: 165.38 LBS | BODY MASS INDEX: 27.56 KG/M2 | SYSTOLIC BLOOD PRESSURE: 122 MMHG | DIASTOLIC BLOOD PRESSURE: 84 MMHG | HEIGHT: 65 IN

## 2020-06-02 DIAGNOSIS — N39.3 STRESS INCONTINENCE: ICD-10-CM

## 2020-06-02 DIAGNOSIS — Z01.419 GYNECOLOGIC EXAM NORMAL: ICD-10-CM

## 2020-06-02 DIAGNOSIS — D25.9 UTERINE LEIOMYOMA, UNSPECIFIED LOCATION: ICD-10-CM

## 2020-06-02 DIAGNOSIS — N63.20 BREAST MASS, LEFT: Primary | ICD-10-CM

## 2020-06-02 PROBLEM — Z86.718 HISTORY OF DVT IN ADULTHOOD: Status: ACTIVE | Noted: 2020-06-02

## 2020-06-02 PROCEDURE — 99396 PR PREVENTIVE VISIT,EST,40-64: ICD-10-PCS | Mod: S$PBB,,, | Performed by: OBSTETRICS & GYNECOLOGY

## 2020-06-02 PROCEDURE — 99999 PR PBB SHADOW E&M-EST. PATIENT-LVL IV: ICD-10-PCS | Mod: PBBFAC,,, | Performed by: OBSTETRICS & GYNECOLOGY

## 2020-06-02 PROCEDURE — 99999 PR PBB SHADOW E&M-EST. PATIENT-LVL IV: CPT | Mod: PBBFAC,,, | Performed by: OBSTETRICS & GYNECOLOGY

## 2020-06-02 PROCEDURE — 99214 OFFICE O/P EST MOD 30 MIN: CPT | Mod: PBBFAC | Performed by: OBSTETRICS & GYNECOLOGY

## 2020-06-02 PROCEDURE — 99396 PREV VISIT EST AGE 40-64: CPT | Mod: S$PBB,,, | Performed by: OBSTETRICS & GYNECOLOGY

## 2020-06-07 NOTE — PROGRESS NOTES
Subjective:       Patient ID: Di Perkins is a 49 y.o. female.    Chief Complaint:  Gynecologic Exam (last pap , WNL) and Breast Mass (L breast )      History of Present Illness  HPI  Annual Exam-Premenopausal  Patient presents for annual exam. The patient is sexually active. GYN screening history: last pap: approximate date 2018 and was normal. The patient wears seatbelts: yes. The patient participates in regular exercise: no. Has the patient ever been transfused or tattooed?: not asked. The patient reports that there is not domestic violence in her life.    Pt c/o left breast mass which pt can feel.  Pt known to have breast cysts in this breast but they were in different locations and had resolved.    Pt also c/o sx's of leakage when coughing or sneezing.  Pt states she can hold her urine when she feels urge.               GYN & OB History  Patient's last menstrual period was 2020 (exact date).   Date of Last Pap: 2018    OB History    Para Term  AB Living   3 1   1   2   SAB TAB Ectopic Multiple Live Births                  # Outcome Date GA Lbr Erwin/2nd Weight Sex Delivery Anes PTL Lv   3             2             1                Obstetric Comments   Gynhx: reg/5. Not heavy. Mild cramp   Sexually active but not using protection   H/o myomectomy in    H/o abnl pap, did not require treatment. Last pap 2015 neg   Denies STD       Review of Systems  Review of Systems   Constitutional: Negative.    Eyes: Negative.    Respiratory: Negative.    Cardiovascular: Negative.    Gastrointestinal: Negative.    Endocrine: Negative.    Genitourinary: Negative.    Musculoskeletal: Negative.    Integumentary:  Positive for breast mass. Negative for nipple discharge, breast skin changes and breast tenderness.   Neurological: Negative.    Psychiatric/Behavioral: Negative.    Breast: Positive for breast self exam, lump and mass.Negative for asymmetry, mastodynia,  nipple discharge, skin changes and tenderness          Objective:    Physical Exam:   Constitutional: She is oriented to person, place, and time. She appears well-developed and well-nourished. No distress.    HENT:   Head: Normocephalic and atraumatic.     Neck: Normal range of motion.     Pulmonary/Chest: Effort normal. No respiratory distress. Right breast exhibits no inverted nipple, no mass, no nipple discharge, no skin change, no tenderness, no bleeding and no swelling. Left breast exhibits mass. Left breast exhibits no inverted nipple, no nipple discharge, no skin change, no tenderness, no bleeding and no swelling. Breasts are symmetrical.            Abdominal: Soft. She exhibits no distension and no mass. There is no tenderness. There is no rebound and no guarding.     Genitourinary: Vagina normal. Pelvic exam was performed with patient supine. There is no rash, tenderness, lesion or injury on the right labia. There is no rash, tenderness, lesion or injury on the left labia. Uterus is enlarged and hosting fibroids. Uterus is not deviated, not fixed, not tender, present and not experiencing uterine prolapse. Cervix is normal. Right adnexum displays no mass, no tenderness and no fullness. Left adnexum displays no mass, no tenderness and no fullness. Labial bartholins normal.          Musculoskeletal: Normal range of motion and moves all extremeties.       Neurological: She is alert and oriented to person, place, and time. No cranial nerve deficit. Coordination normal.    Skin: She is not diaphoretic.    Psychiatric: She has a normal mood and affect. Her behavior is normal. Judgment and thought content normal.          Assessment:        1. Breast mass, left    2. Stress incontinence    3. Gynecologic exam normal    4. Uterine leiomyoma, unspecified location               Plan:      1.  DX mmg and L breast US, referral to breast surgery  2.  Referral to urology  3.   Pap smear not indicated at this time  4.   Known uterine fibroids asymptomatic at this time

## 2020-06-10 ENCOUNTER — HOSPITAL ENCOUNTER (OUTPATIENT)
Dept: RADIOLOGY | Facility: HOSPITAL | Age: 50
Discharge: HOME | End: 2020-06-10
Attending: OBSTETRICS & GYNECOLOGY
Payer: MEDICAID

## 2020-06-10 VITALS — WEIGHT: 165.38 LBS | BODY MASS INDEX: 27.56 KG/M2 | HEIGHT: 65 IN

## 2020-06-10 DIAGNOSIS — N63.20 BREAST MASS, LEFT: ICD-10-CM

## 2020-06-10 PROCEDURE — 77062 BREAST TOMOSYNTHESIS BI: CPT | Mod: TC

## 2020-06-10 PROCEDURE — 76642 ULTRASOUND BREAST LIMITED: CPT | Mod: TC,LT

## 2020-06-10 PROCEDURE — 77062 BREAST TOMOSYNTHESIS BI: CPT | Mod: 26,,, | Performed by: RADIOLOGY

## 2020-06-10 PROCEDURE — 77066 MAMMO DIGITAL DIAGNOSTIC BILAT WITH TOMOSYNTHESIS_CAD: ICD-10-PCS | Mod: 26,,, | Performed by: RADIOLOGY

## 2020-06-10 PROCEDURE — 77066 DX MAMMO INCL CAD BI: CPT | Mod: 26,,, | Performed by: RADIOLOGY

## 2020-06-10 PROCEDURE — 76642 ULTRASOUND BREAST LIMITED: CPT | Mod: 26,LT,, | Performed by: RADIOLOGY

## 2020-06-10 PROCEDURE — 76642 US BREAST LEFT LIMITED: ICD-10-PCS | Mod: 26,LT,, | Performed by: RADIOLOGY

## 2020-06-10 PROCEDURE — 77062 MAMMO DIGITAL DIAGNOSTIC BILAT WITH TOMOSYNTHESIS_CAD: ICD-10-PCS | Mod: 26,,, | Performed by: RADIOLOGY

## 2020-10-13 ENCOUNTER — TELEPHONE (OUTPATIENT)
Dept: ORTHOPEDICS | Facility: CLINIC | Age: 50
End: 2020-10-13

## 2020-10-13 DIAGNOSIS — M25.562 LEFT KNEE PAIN, UNSPECIFIED CHRONICITY: Primary | ICD-10-CM

## 2020-10-13 NOTE — TELEPHONE ENCOUNTER
Spoke with pt. Advised pt she will need xrays prior to appt. Images ordered and scheduled. All questions answered. Pt verbalized understanding.

## 2020-10-15 ENCOUNTER — OFFICE VISIT (OUTPATIENT)
Dept: ORTHOPEDICS | Facility: CLINIC | Age: 50
End: 2020-10-15
Payer: MEDICAID

## 2020-10-15 VITALS
WEIGHT: 172.31 LBS | DIASTOLIC BLOOD PRESSURE: 78 MMHG | HEART RATE: 72 BPM | SYSTOLIC BLOOD PRESSURE: 120 MMHG | HEIGHT: 65 IN | BODY MASS INDEX: 28.71 KG/M2

## 2020-10-15 DIAGNOSIS — M23.204 OLD TEAR OF MEDIAL MENISCUS OF LEFT KNEE, UNSPECIFIED TEAR TYPE: Primary | ICD-10-CM

## 2020-10-15 PROCEDURE — 99999 PR PBB SHADOW E&M-EST. PATIENT-LVL IV: CPT | Mod: PBBFAC,,, | Performed by: ORTHOPAEDIC SURGERY

## 2020-10-15 PROCEDURE — 99203 PR OFFICE/OUTPT VISIT, NEW, LEVL III, 30-44 MIN: ICD-10-PCS | Mod: S$PBB,,, | Performed by: ORTHOPAEDIC SURGERY

## 2020-10-15 PROCEDURE — 99214 OFFICE O/P EST MOD 30 MIN: CPT | Mod: PBBFAC,PN | Performed by: ORTHOPAEDIC SURGERY

## 2020-10-15 PROCEDURE — 99203 OFFICE O/P NEW LOW 30 MIN: CPT | Mod: S$PBB,,, | Performed by: ORTHOPAEDIC SURGERY

## 2020-10-15 PROCEDURE — 99999 PR PBB SHADOW E&M-EST. PATIENT-LVL IV: ICD-10-PCS | Mod: PBBFAC,,, | Performed by: ORTHOPAEDIC SURGERY

## 2020-10-15 NOTE — PROGRESS NOTES
Subjective:    Patient ID:  Di Perkins is a 49 y.o. y.o. female who presents for initial visit for Pain of the Left Knee      50 yo female reports few year h/o intermittent left knee pain. Onset first noted after getting up from a kneeling position. Pain localized to anterior knee, achy and aggravated with bending, squatting, rising from a chair and stair climbing. Notes associated swelling, locking and stiffness. Symptoms worsening over past 3-4 months. Denies night/rest pain, giving way or constitutional sxs. Has been taking ibuprofen with minimal relief. She has not had any other specific treatment to date.         Past Medical History:   Diagnosis Date    Deep vein thrombosis 2002    left forearm, due to Factor 5 Leiden    Factor 5 Leiden mutation, heterozygous         Past Surgical History:   Procedure Laterality Date    UTERINE FIBROID SURGERY         Review of patient's allergies indicates:  No Known Allergies       Current Outpatient Medications:     benzonatate (TESSALON) 100 MG capsule, Take 1 capsule (100 mg total) by mouth 3 (three) times daily as needed for Cough. (Patient not taking: Reported on 6/2/2020), Disp: 20 capsule, Rfl: 0    DEXTROMETHORPHAN HBR (VICKS DAYQUIL COUGH ORAL), Take by mouth.  , Disp: , Rfl:     DM/P-EPHED/ACETAMINOPH/DOXYLAM (NYQUIL ORAL), Take by mouth.  , Disp: , Rfl:     naproxen (NAPROSYN) 500 MG tablet, Take 1 tablet (500 mg total) by mouth 2 (two) times daily as needed (for pain). (Patient not taking: Reported on 6/2/2020), Disp: 30 tablet, Rfl: 0    Social History     Socioeconomic History    Marital status:      Spouse name: Not on file    Number of children: Not on file    Years of education: Not on file    Highest education level: Not on file   Occupational History    Not on file   Social Needs    Financial resource strain: Not on file    Food insecurity     Worry: Not on file     Inability: Not on file    Transportation needs     Medical:  "Not on file     Non-medical: Not on file   Tobacco Use    Smoking status: Never Smoker    Smokeless tobacco: Never Used   Substance and Sexual Activity    Alcohol use: No    Drug use: No    Sexual activity: Yes     Partners: Male     Birth control/protection: None   Lifestyle    Physical activity     Days per week: Not on file     Minutes per session: Not on file    Stress: Not on file   Relationships    Social connections     Talks on phone: Not on file     Gets together: Not on file     Attends [employer] service: Not on file     Active member of club or organization: Not on file     Attends meetings of clubs or organizations: Not on file     Relationship status: Not on file   Other Topics Concern    Not on file   Social History Narrative    Not on file        Family History   Problem Relation Age of Onset    Diabetes Mother         Review of Systems   Constitutional: Negative for chills and fever.   HENT: Negative for hearing loss.    Eyes: Negative for blurred vision.   Respiratory: Negative for shortness of breath.    Cardiovascular: Negative for chest pain.   Gastrointestinal: Negative for nausea and vomiting.   Genitourinary: Negative for dysuria.   Musculoskeletal: Negative for myalgias.   Skin: Negative for rash.   Neurological: Negative for speech change and loss of consciousness.   Endo/Heme/Allergies: Does not bruise/bleed easily.   Psychiatric/Behavioral: Negative for depression.        Objective:     /78 (BP Location: Right arm, Patient Position: Sitting, BP Method: Small (Automatic))   Pulse 72   Ht 5' 5" (1.651 m)   Wt 78.1 kg (172 lb 4.6 oz)   BMI 28.67 kg/m²     Ortho Exam     48 yo female in NAD; alert, oriented x 3    BLE: N/V intact; no edema    Left knee: trace effusion; patella stable; tender MJL; FROM; no gross ligamentous laxity; positive Valarie's    Imaging:     X-rays standing AP bilateral knee, lateral/sunrise left knee taken today are independently reviewed by me " and show mild lateral patellar tilt; joint spaces well-maintained.      Assessment & Plan:      1. Old tear of medial meniscus of left knee, unspecified tear type       1.  Findings, diagnosis, treatment options/risks/benefits were reviewed  2.  PT  3.  OTC NSAID prn  4.  Maintain judicious activity modification/limitation  5.  Follow-up in 6-8 weeks

## 2020-10-15 NOTE — LETTER
October 15, 2020      LUCIA Farrell  [address]  [place] [address]           [facility]  Orthopedics  [address], [facility] [address]  [facility] [address]  Phone: [phone]  Fax: [phone]          Patient: Di Perkins   MR Number: [id number]   YOB: 1970   Date of Visit: 10/15/2020       Dear Virginia Gunn:    Thank you for referring Di Perkins to me for evaluation. Attached you will find relevant portions of my assessment and plan of care.    If you have questions, please do not hesitate to call me. I look forward to following Di Perkins along with you.    Sincerely,    Devin David MD    Enclosure  CC:  No Recipients    If you would like to receive this communication electronically, please contact [email or web address] or [phone] to request more information on [employer] Link access.    For providers and/or their staff who would like to refer a patient to [facility], please contact us through our one-stop-shop provider referral line, [facility], at [phone].    If you feel you have received this communication in error or would no longer like to receive these types of communications, please e-mail [email or web address]

## 2020-11-27 ENCOUNTER — CLINICAL SUPPORT (OUTPATIENT)
Dept: REHABILITATION | Facility: HOSPITAL | Age: 50
End: 2020-11-27
Attending: ORTHOPAEDIC SURGERY
Payer: MEDICAID

## 2020-11-27 DIAGNOSIS — M62.81 QUADRICEPS WEAKNESS: ICD-10-CM

## 2020-11-27 DIAGNOSIS — M25.462 SWELLING OF JOINT OF LEFT KNEE: ICD-10-CM

## 2020-11-27 DIAGNOSIS — Z74.09 IMPAIRED MOBILITY AND ADLS: ICD-10-CM

## 2020-11-27 DIAGNOSIS — Z78.9 IMPAIRED MOBILITY AND ADLS: ICD-10-CM

## 2020-11-27 DIAGNOSIS — M23.204 OLD TEAR OF MEDIAL MENISCUS OF LEFT KNEE, UNSPECIFIED TEAR TYPE: ICD-10-CM

## 2020-11-27 PROCEDURE — 97162 PT EVAL MOD COMPLEX 30 MIN: CPT | Mod: PN

## 2020-11-27 NOTE — PLAN OF CARE
[facility]  Physical Therapy Initial Evaluation    Date: 11/27/2020   Name: Di Perkins  Clinic Number: [id number]    Therapy Diagnosis:   Encounter Diagnoses   Name Primary?    Old tear of medial meniscus of left knee, unspecified tear type     Swelling of joint of left knee     Quadriceps weakness      Physician: Devin David MD    Physician Orders: PT Eval and Treat   Medical Diagnosis from Referral: M23.204 (ICD-10-CM) - Old tear of medial meniscus of left knee, unspecified tear type     Evaluation Date: 11/27/2020  Authorization Period Expiration: 12/3/20  Plan of Care Expiration: 2/18/21  Visit # / Visits authorized: 1/ 1    Time In: 10:35 am  Time Out: 11:15 am  Total Appointment Time (timed & untimed codes): 40 minutes    Precautions: Standard    Subjective   Date of onset: 10/2020  History of current condition - Di reports: chronic L knee pain for years with intermittent swelling and knee stiffness. She said MD thinks she has a meniscus tear and she reports no surgical history L knee and denies injection.      Medical History:   Past Medical History:   Diagnosis Date    Deep vein thrombosis 2002    left forearm, due to Factor 5 Leiden    Factor 5 Leiden mutation, heterozygous        Surgical History:   Di Perkins  has a past surgical history that includes Uterine fibroid surgery.    Medications:   Di has a current medication list which includes the following prescription(s): benzonatate, dextromethorphan hbr, dm/p-ephed/acetaminoph/doxylam, and naproxen.    Allergies:   Review of patient's allergies indicates:  No Known Allergies     Imaging, X rays L knee: Impression:     No osseous abnormality.      Prior Therapy: none  Occupation: Works at a   Prior Level of Function: independent   Current Level of Function: independent but painful    Pain:  Current 3/10, worst 5/10, best 2/10   Location: left knee   Description: pressure  Aggravating  Factors: Sitting, Standing and squatting  Easing Factors: sitting    Patients goals: reduce pain    Objective     Observation: Ambulates with non antalgic gait pattern    Posture: Neutral patellofemoral alignment in standing      Range of Motion:   Knee Left active Right Active   Flexion 120 120   Extension +1 +3           Lower Extremity Strength  Right LE  Left LE    Knee extension: 5/5 Knee extension: 4/5   Knee flexion: 5/5 Knee flexion: 4/5   Hip flexion: 5/5 Hip flexion: 5/5   Hip extension:  4/5 Hip extension: 4-/5   Hip abduction: 4/5 Hip abduction: 4-/5           Special Tests:   Left Right   Valgus Stress Test negative negative   Varus Stress test negative Negative    Tato's Test NT due to pain and edema NT       Joint Mobility: WNL L patella    Palpation: TTP globally L knee wm patella    Sensation: SILT B LE    Flexibility: WNL B LE    Edema: unable to measure secondary to patient wearing tight fitting jeans       Limitation/Restriction for FOTO Knee Survey    Therapist reviewed FOTO scores for Di Perkins on 11/27/2020.   FOTO documents entered into EPIC - see Media section.    Limitation Score: 48%         TREATMENT   Treatment Time In: 11:05 am  Treatment Time Out: 11:15 am  Total Treatment time (time-based codes) separate from Evaluation: 10 minutes    Di received therapeutic exercises to develop strength for 10 minutes including:  Quad set with towel roll L 2 x 10 5 sec  SLR L 1 x 10   Bridges 1 x 10 5 sec hold  Sidelying hip abduction L 1 x 10  TKE with BTB 1 x10 3 sec hold      Di received the following manual therapy techniques:  for 0 minutes, including:      Di participated in neuromuscular re-education activities to improve: Balance for 0 minutes. The following activities were included:      Di participated in dynamic functional therapeutic activities to improve functional performance for 0  minutes, including:      Di received cold pack for 0  minutes.    Home Exercises and Patient Education Provided    Education provided:   - role of PT, plan of care, involved anatomy and pathology,  goals, rational for treatment and exercise, scheduling limitations      Written Home Exercises Provided: yes.  Exercises were reviewed and Di was able to demonstrate them prior to the end of the session.  Di demonstrated good  understanding of the education provided.     See EMR under Patient Instructions for exercises provided 11/27/2020.    Assessment   Di is a 49 y.o. female referred to outpatient Physical Therapy with a medical diagnosis of Old tear of medial meniscus of left knee, unspecified tear type. Patient presents with L knee pain, TTP globally L wm patella, and L knee/hip weakness. Signs/symptoms consistent with meniscus pathology, although mensicus specific special tests not performed due to pain. Pt would benefit from skilled PT services in order to address listed deficits, improve tolerance to activities, establish HEP, and decrease pain to maximize quality of life. Pt is motivated to participate in therapy and is in agreement with POC.       Patient prognosis is Good.   Patientt will benefit from skilled outpatient Physical Therapy to address the deficits stated above and in the chart below, provide patient /family education, and to maximize patientt's level of independence.     Plan of care discussed with patient: Yes  Patient's spiritual, cultural and educational needs considered and patient is agreeable to the plan of care and goals as stated below:     Anticipated Barriers for therapy: chronicity of knee pain    Medical Necessity is demonstrated by the following  History  Co-morbidities and personal factors that may impact the plan of care Co-morbidities:   history of DVT    Personal Factors:   no deficits     moderate   Examination  Body Structures and Functions, activity limitations and participation restrictions that may impact the  plan of care Body Regions:   lower extremities    Body Systems:    strength  gross coordinated movement  balance  gait  transfers  transitions  motor control    Participation Restrictions:   Walking, squatting    Activity limitations:   Learning and applying knowledge  no deficits    General Tasks and Commands  no deficits    Communication  no deficits    Mobility  walking    Self care  no deficits    Domestic Life  doing house work (cleaning house, washing dishes, laundry)    Interactions/Relationships  no deficits    Life Areas  employment    Community and Social Life  community life  recreation and leisure         high   Clinical Presentation evolving clinical presentation with changing clinical characteristics moderate   Decision Making/ Complexity Score: moderate     Goals:  Short Term Goals: 4 weeks   1. The patient with report compliance with HEP to maximize functional outcomes.  2. The patient will demonstrate increase in L knee MMT >/= 4+/5 to improve tolerance to stair climbing.  3. The patient will report </= 40 % functional limitation on FOTO to indicate an improvement in overall function.    Long Term Goals: 8 weeks   1. The patient will demonstrate understanding and performance of advanced HEP to allow for independence once discharged from therapy.   2. The patient will demonstrate increase in L knee MMT 5/5 grade to improve ease of squatting/lifting during heavy household chores.   3. The patient will report </= 38 % functional limitation on FOTO to indicate an improvement in overall function.  4. The patient will demonstrate 10 sit <> stands >/= 15# with pain </= 3/10 to indicate improved tolerance to loading of L knee during functional tasks.    Plan   Plan of care Certification: 11/27/2020 to 2/18/21.    Outpatient Physical Therapy 2 times weekly for 8 weeks to include the following interventions: Gait Training, Manual Therapy, Moist Heat/ Ice, Neuromuscular Re-ed, Patient Education, Self Care,  Therapeutic Activites and Therapeutic Exercise.     MARCELO RIVERA, PT

## 2020-12-03 ENCOUNTER — CLINICAL SUPPORT (OUTPATIENT)
Dept: REHABILITATION | Facility: HOSPITAL | Age: 50
End: 2020-12-03
Attending: ORTHOPAEDIC SURGERY
Payer: MEDICAID

## 2020-12-03 DIAGNOSIS — M62.81 QUADRICEPS WEAKNESS: ICD-10-CM

## 2020-12-03 DIAGNOSIS — Z74.09 IMPAIRED MOBILITY AND ADLS: ICD-10-CM

## 2020-12-03 DIAGNOSIS — M25.462 SWELLING OF JOINT OF LEFT KNEE: ICD-10-CM

## 2020-12-03 DIAGNOSIS — Z78.9 IMPAIRED MOBILITY AND ADLS: ICD-10-CM

## 2020-12-03 PROCEDURE — 97110 THERAPEUTIC EXERCISES: CPT | Mod: PN

## 2020-12-03 NOTE — PROGRESS NOTES
Physical Therapy Treatment Note     Name: Di Perkins  Clinic Number: [id number]    Therapy Diagnosis:   Encounter Diagnoses   Name Primary?    Swelling of joint of left knee     Quadriceps weakness     Impaired mobility and ADLs      Physician: Devin David MD    Visit Date: 12/3/2020    Physician Orders: PT Eval and Treat   Medical Diagnosis from Referral: M23.204 (ICD-10-CM) - Old tear of medial meniscus of left knee, unspecified tear type      Evaluation Date: 11/27/2020  Authorization Period Expiration: 12/3/20  Plan of Care Expiration: 2/18/21  Visit # / Visits authorized: 2/5      Time In: 3:45 pm  Time Out: 4:30 pm  Total Billable Time: 45 minutes  Precautions: Standard       Subjective     Pt reports: she is doing okay today because she has been moving a lot. Her pain is worse with prolonged sitting.   She was compliant with home exercise program.  Response to previous treatment: no adverse reactions  Functional change: none reported    Pain: 3/10  Location: left knee      Objective     Di received therapeutic exercises to develop strength and ROM for 45 minutes including:  Nustep x 6 min level 4 (upright bike next)  Quad set with heel prop 3 x 10 5 sec hold   SLR L 2 x 10   Sidelying hip abduction 2 x 10 ea 2#   +double to single leg calf raise 2 x 10   +gastroc/soleus stretch on wedge 1 x 1 min   +SL deadlift to cone 2 x 10 7.5# kettlebell   +lateral heel tap 2 x 12 4 inch step   +SLS on foam 1 x 1 min   +L hamstring stretch on step 2 x 30 sec  TKE with purple cook band 3 x 10  Add sit <> stands with #, side steps with band, and SL press next visit       Di received the following manual therapy techniques:  for 0 minutes, including:      Di participated in neuromuscular re-education activities to improve: Balance and Proprioception for 0 minutes. The following activities were included:      Di participated in dynamic functional therapeutic activities to improve  functional performance for 0  minutes, including:          Home Exercises Provided and Patient Education Provided     Education provided:   - continue HEP, add new exercises    Written Home Exercises Provided: yes.  Exercises were reviewed and Di was able to demonstrate them prior to the end of the session.  Di demonstrated good  understanding of the education provided.     See EMR under Patient Instructions for exercises provided 12/3/2020.    Assessment   The patient tolerated today's visit well. She was limited by pain and poor eccentric control of lateral heel taps from 6 inch step so 4 inch step was utilized. Some difficulty with balance during L SL deadlift and SLS on foam. Able to advance reps and weight with mat exercises without increased pain. She continues to remain appropriate for skilled PT to improve eccentric quad strength and reduce pain with prolonged sitting.   Di is progressing well towards her goals.   Pt prognosis is Good.     Pt will continue to benefit from skilled outpatient physical therapy to address the deficits listed in the problem list box on initial evaluation, provide pt/family education and to maximize pt's level of independence in the home and community environment.     Pt's spiritual, cultural and educational needs considered and pt agreeable to plan of care and goals.     Anticipated Barriers for therapy: chronicity of knee pain      Goals:  Short Term Goals: 4 weeks   1. The patient with report compliance with HEP to maximize functional outcomes.  2. The patient will demonstrate increase in L knee MMT >/= 4+/5 to improve tolerance to stair climbing.  3. The patient will report </= 40 % functional limitation on FOTO to indicate an improvement in overall function.     Long Term Goals: 8 weeks   1. The patient will demonstrate understanding and performance of advanced HEP to allow for independence once discharged from therapy.   2. The patient will demonstrate  increase in L knee MMT 5/5 grade to improve ease of squatting/lifting during heavy household chores.   3. The patient will report </= 38 % functional limitation on FOTO to indicate an improvement in overall function.  4. The patient will demonstrate 10 sit <> stands >/= 15# with pain </= 3/10 to indicate improved tolerance to loading of L knee during functional tasks.      Plan     Continue with skilled PT.     MARCELO RIVERA, PT

## 2020-12-11 ENCOUNTER — DOCUMENTATION ONLY (OUTPATIENT)
Dept: REHABILITATION | Facility: HOSPITAL | Age: 50
End: 2020-12-11

## 2020-12-18 ENCOUNTER — DOCUMENTATION ONLY (OUTPATIENT)
Dept: REHABILITATION | Facility: HOSPITAL | Age: 50
End: 2020-12-18

## 2020-12-18 NOTE — PROGRESS NOTES
Physical Therapy    Patient did not show up for today's therapy session. The patient will be removed from schedule if NS next appointment.     Cancel: 1    No Show: 3      Melly Mc PT, DPT  12/18/2020

## 2020-12-21 ENCOUNTER — OFFICE VISIT (OUTPATIENT)
Dept: ORTHOPEDICS | Facility: CLINIC | Age: 50
End: 2020-12-21
Payer: MEDICAID

## 2020-12-21 VITALS
HEIGHT: 65 IN | RESPIRATION RATE: 16 BRPM | SYSTOLIC BLOOD PRESSURE: 124 MMHG | DIASTOLIC BLOOD PRESSURE: 92 MMHG | WEIGHT: 179.81 LBS | BODY MASS INDEX: 29.96 KG/M2

## 2020-12-21 DIAGNOSIS — M23.204 OLD TEAR OF MEDIAL MENISCUS OF LEFT KNEE, UNSPECIFIED TEAR TYPE: Primary | ICD-10-CM

## 2020-12-21 PROCEDURE — 99213 PR OFFICE/OUTPT VISIT, EST, LEVL III, 20-29 MIN: ICD-10-PCS | Mod: S$PBB,,, | Performed by: ORTHOPAEDIC SURGERY

## 2020-12-21 PROCEDURE — 99213 OFFICE O/P EST LOW 20 MIN: CPT | Mod: PBBFAC,PN | Performed by: ORTHOPAEDIC SURGERY

## 2020-12-21 PROCEDURE — 99213 OFFICE O/P EST LOW 20 MIN: CPT | Mod: S$PBB,,, | Performed by: ORTHOPAEDIC SURGERY

## 2020-12-21 PROCEDURE — 99999 PR PBB SHADOW E&M-EST. PATIENT-LVL III: ICD-10-PCS | Mod: PBBFAC,,, | Performed by: ORTHOPAEDIC SURGERY

## 2020-12-21 PROCEDURE — 99999 PR PBB SHADOW E&M-EST. PATIENT-LVL III: CPT | Mod: PBBFAC,,, | Performed by: ORTHOPAEDIC SURGERY

## 2020-12-21 NOTE — PROGRESS NOTES
"Subjective:    Patient ID:  Di Perkins is a 50 y.o. y.o. female who presents for f/u visit for Pain of the Left Knee      Patient returns for follow-up for left knee pain. Notes improved symptoms. Has been attending PT.          Objective:     BP (!) 124/92 (BP Location: Right arm, Patient Position: Sitting, BP Method: Small (Manual))   Resp 16   Ht 5' 5" (1.651 m)   Wt 81.5 kg (179 lb 12.6 oz)   BMI 29.92 kg/m²     Ortho Exam     50 yo female in NAD; alert, oriented x 3; normal mood and affect    Head: atraumatic  Eyes: EOM are normal. Right eye exhibits no discharge. Left eye exhibits no discharge  Cardiovascular: normal rate    Pulmonary/Chest: effort normal; no respiratory distress  Abdominal: soft     BLE: N/V intact; no edema     Left knee: no effusion; patella stable; tender MJL; FROM; no gross ligamentous laxity; positive Valarie's        Assessment & Plan:     Symptomatically improved    1.  Continue PT, transition to HEP at therapist's discretion  2.  Progressive increase in activity to tolerance  3.  Follow-up prn  "

## 2020-12-23 ENCOUNTER — DOCUMENTATION ONLY (OUTPATIENT)
Dept: REHABILITATION | Facility: HOSPITAL | Age: 50
End: 2020-12-23

## 2020-12-23 NOTE — PROGRESS NOTES
Outpatient Therapy Discharge Summary     Name: Di Perkins  Clinic Number: [id number]     Therapy Diagnosis:        Encounter Diagnoses   Name Primary?    Old tear of medial meniscus of left knee, unspecified tear type      Swelling of joint of left knee      Quadriceps weakness        Physician: Devin David MD     Physician Orders: PT Eval and Treat   Medical Diagnosis from Referral: M23.204 (ICD-10-CM) - Old tear of medial meniscus of left knee, unspecified tear type   Evaluation Date: 11/27/20      Date of Last visit: 12/3/20  Total Visits Received: eval + 1 follow up  Cancelled Visits: 1  No Show Visits: 4    Assessment    No goals met and unable to re-assess patient secondary to high frequency of NS.     Goals:  Short Term Goals: 4 weeks   1. The patient with report compliance with HEP to maximize functional outcomes.  2. The patient will demonstrate increase in L knee MMT >/= 4+/5 to improve tolerance to stair climbing.  3. The patient will report </= 40 % functional limitation on FOTO to indicate an improvement in overall function.     Long Term Goals: 8 weeks   1. The patient will demonstrate understanding and performance of advanced HEP to allow for independence once discharged from therapy.   2. The patient will demonstrate increase in L knee MMT 5/5 grade to improve ease of squatting/lifting during heavy household chores.   3. The patient will report </= 38 % functional limitation on FOTO to indicate an improvement in overall function.  4. The patient will demonstrate 10 sit <> stands >/= 15# with pain </= 3/10 to indicate improved tolerance to loading of L knee during functional tasks.    Discharge reason: Patient has not attended therapy since 12/3/20 and has NS 4 visits and Cx 1.     Plan   This patient is discharged from Physical Therapy

## 2021-03-10 ENCOUNTER — IMMUNIZATION (OUTPATIENT)
Dept: OBSTETRICS AND GYNECOLOGY | Facility: CLINIC | Age: 51
End: 2021-03-10
Payer: MEDICAID

## 2021-03-10 DIAGNOSIS — Z23 NEED FOR VACCINATION: Primary | ICD-10-CM

## 2021-03-10 PROCEDURE — 91300 COVID-19, MRNA, LNP-S, PF, 30 MCG/0.3 ML DOSE VACCINE: CPT | Mod: PBBFAC | Performed by: FAMILY MEDICINE

## 2021-03-31 ENCOUNTER — IMMUNIZATION (OUTPATIENT)
Dept: OBSTETRICS AND GYNECOLOGY | Facility: CLINIC | Age: 51
End: 2021-03-31
Payer: MEDICAID

## 2021-03-31 DIAGNOSIS — Z23 NEED FOR VACCINATION: Primary | ICD-10-CM

## 2021-03-31 PROCEDURE — 91300 COVID-19, MRNA, LNP-S, PF, 30 MCG/0.3 ML DOSE VACCINE: CPT | Mod: PBBFAC | Performed by: FAMILY MEDICINE

## 2021-03-31 PROCEDURE — 0002A COVID-19, MRNA, LNP-S, PF, 30 MCG/0.3 ML DOSE VACCINE: CPT | Mod: PBBFAC | Performed by: FAMILY MEDICINE

## 2021-04-23 ENCOUNTER — OFFICE VISIT (OUTPATIENT)
Dept: OBSTETRICS AND GYNECOLOGY | Facility: CLINIC | Age: 51
End: 2021-04-23
Payer: MEDICAID

## 2021-04-23 ENCOUNTER — LAB VISIT (OUTPATIENT)
Dept: LAB | Facility: HOSPITAL | Age: 51
End: 2021-04-23
Attending: OBSTETRICS & GYNECOLOGY
Payer: MEDICAID

## 2021-04-23 VITALS
WEIGHT: 158.38 LBS | SYSTOLIC BLOOD PRESSURE: 120 MMHG | BODY MASS INDEX: 26.36 KG/M2 | DIASTOLIC BLOOD PRESSURE: 74 MMHG

## 2021-04-23 DIAGNOSIS — N92.4 EXCESSIVE BLEEDING IN PREMENOPAUSAL PERIOD: ICD-10-CM

## 2021-04-23 DIAGNOSIS — Z12.31 BREAST CANCER SCREENING BY MAMMOGRAM: Primary | ICD-10-CM

## 2021-04-23 LAB
ESTIMATED AVG GLUCOSE: 111 MG/DL (ref 68–131)
ESTRADIOL SERPL-MCNC: <10 PG/ML
FSH SERPL-ACNC: 78.6 MIU/ML
HBA1C MFR BLD: 5.5 % (ref 4–5.6)
LH SERPL-ACNC: 28.9 MIU/ML
TSH SERPL DL<=0.005 MIU/L-ACNC: 1.98 UIU/ML (ref 0.4–4)

## 2021-04-23 PROCEDURE — 99999 PR PBB SHADOW E&M-EST. PATIENT-LVL III: CPT | Mod: PBBFAC,,, | Performed by: OBSTETRICS & GYNECOLOGY

## 2021-04-23 PROCEDURE — 99999 PR PBB SHADOW E&M-EST. PATIENT-LVL III: ICD-10-PCS | Mod: PBBFAC,,, | Performed by: OBSTETRICS & GYNECOLOGY

## 2021-04-23 PROCEDURE — 99213 OFFICE O/P EST LOW 20 MIN: CPT | Mod: PBBFAC | Performed by: OBSTETRICS & GYNECOLOGY

## 2021-04-23 PROCEDURE — 83036 HEMOGLOBIN GLYCOSYLATED A1C: CPT | Performed by: OBSTETRICS & GYNECOLOGY

## 2021-04-23 PROCEDURE — 99213 OFFICE O/P EST LOW 20 MIN: CPT | Mod: S$PBB,,, | Performed by: OBSTETRICS & GYNECOLOGY

## 2021-04-23 PROCEDURE — 84443 ASSAY THYROID STIM HORMONE: CPT | Performed by: OBSTETRICS & GYNECOLOGY

## 2021-04-23 PROCEDURE — 83002 ASSAY OF GONADOTROPIN (LH): CPT | Performed by: OBSTETRICS & GYNECOLOGY

## 2021-04-23 PROCEDURE — 99213 PR OFFICE/OUTPT VISIT, EST, LEVL III, 20-29 MIN: ICD-10-PCS | Mod: S$PBB,,, | Performed by: OBSTETRICS & GYNECOLOGY

## 2021-04-23 PROCEDURE — 82670 ASSAY OF TOTAL ESTRADIOL: CPT | Performed by: OBSTETRICS & GYNECOLOGY

## 2021-04-23 PROCEDURE — 36415 COLL VENOUS BLD VENIPUNCTURE: CPT | Performed by: OBSTETRICS & GYNECOLOGY

## 2021-04-23 PROCEDURE — 83001 ASSAY OF GONADOTROPIN (FSH): CPT | Performed by: OBSTETRICS & GYNECOLOGY

## 2021-05-25 ENCOUNTER — TELEPHONE (OUTPATIENT)
Dept: OBSTETRICS AND GYNECOLOGY | Facility: CLINIC | Age: 51
End: 2021-05-25

## 2021-07-13 ENCOUNTER — OFFICE VISIT (OUTPATIENT)
Dept: OBSTETRICS AND GYNECOLOGY | Facility: CLINIC | Age: 51
End: 2021-07-13
Payer: MEDICAID

## 2021-07-13 VITALS — WEIGHT: 149.5 LBS | BODY MASS INDEX: 24.87 KG/M2 | DIASTOLIC BLOOD PRESSURE: 60 MMHG | SYSTOLIC BLOOD PRESSURE: 120 MMHG

## 2021-07-13 DIAGNOSIS — Z01.419 ROUTINE GYNECOLOGICAL EXAMINATION: Primary | ICD-10-CM

## 2021-07-13 DIAGNOSIS — N30.01 ACUTE CYSTITIS WITH HEMATURIA: ICD-10-CM

## 2021-07-13 DIAGNOSIS — R35.0 URINARY FREQUENCY: ICD-10-CM

## 2021-07-13 PROCEDURE — 87086 URINE CULTURE/COLONY COUNT: CPT | Performed by: OBSTETRICS & GYNECOLOGY

## 2021-07-13 PROCEDURE — 99396 PREV VISIT EST AGE 40-64: CPT | Mod: S$PBB,,, | Performed by: OBSTETRICS & GYNECOLOGY

## 2021-07-13 PROCEDURE — 88175 CYTOPATH C/V AUTO FLUID REDO: CPT | Performed by: OBSTETRICS & GYNECOLOGY

## 2021-07-13 PROCEDURE — 99999 PR PBB SHADOW E&M-EST. PATIENT-LVL II: CPT | Mod: PBBFAC,,, | Performed by: OBSTETRICS & GYNECOLOGY

## 2021-07-13 PROCEDURE — 87624 HPV HI-RISK TYP POOLED RSLT: CPT | Performed by: OBSTETRICS & GYNECOLOGY

## 2021-07-13 PROCEDURE — 87077 CULTURE AEROBIC IDENTIFY: CPT | Performed by: OBSTETRICS & GYNECOLOGY

## 2021-07-13 PROCEDURE — 87088 URINE BACTERIA CULTURE: CPT | Performed by: OBSTETRICS & GYNECOLOGY

## 2021-07-13 PROCEDURE — 99999 PR PBB SHADOW E&M-EST. PATIENT-LVL II: ICD-10-PCS | Mod: PBBFAC,,, | Performed by: OBSTETRICS & GYNECOLOGY

## 2021-07-13 PROCEDURE — 87186 SC STD MICRODIL/AGAR DIL: CPT | Performed by: OBSTETRICS & GYNECOLOGY

## 2021-07-13 PROCEDURE — 99396 PR PREVENTIVE VISIT,EST,40-64: ICD-10-PCS | Mod: S$PBB,,, | Performed by: OBSTETRICS & GYNECOLOGY

## 2021-07-13 PROCEDURE — 99212 OFFICE O/P EST SF 10 MIN: CPT | Mod: PBBFAC | Performed by: OBSTETRICS & GYNECOLOGY

## 2021-07-13 RX ORDER — CIPROFLOXACIN 500 MG/1
500 TABLET ORAL 2 TIMES DAILY
Qty: 6 TABLET | Refills: 0 | Status: SHIPPED | OUTPATIENT
Start: 2021-07-13 | End: 2021-07-16

## 2021-07-15 LAB — BACTERIA UR CULT: ABNORMAL

## 2021-07-18 LAB
FINAL PATHOLOGIC DIAGNOSIS: NORMAL
Lab: NORMAL

## 2021-07-20 LAB
HPV HR 12 DNA SPEC QL NAA+PROBE: NEGATIVE
HPV16 AG SPEC QL: NEGATIVE
HPV18 DNA SPEC QL NAA+PROBE: NEGATIVE

## 2021-11-22 ENCOUNTER — OFFICE VISIT (OUTPATIENT)
Dept: URGENT CARE | Facility: CLINIC | Age: 51
End: 2021-11-22
Payer: MEDICAID

## 2021-11-22 VITALS
HEART RATE: 75 BPM | OXYGEN SATURATION: 98 % | BODY MASS INDEX: 24.92 KG/M2 | RESPIRATION RATE: 15 BRPM | WEIGHT: 146 LBS | TEMPERATURE: 98 F | DIASTOLIC BLOOD PRESSURE: 87 MMHG | HEIGHT: 64 IN | SYSTOLIC BLOOD PRESSURE: 135 MMHG

## 2021-11-22 DIAGNOSIS — J06.9 VIRAL URI: ICD-10-CM

## 2021-11-22 DIAGNOSIS — H65.90 FLUID LEVEL BEHIND TYMPANIC MEMBRANE, UNSPECIFIED LATERALITY: Primary | ICD-10-CM

## 2021-11-22 PROCEDURE — 99203 OFFICE O/P NEW LOW 30 MIN: CPT | Mod: S$GLB,,, | Performed by: PHYSICIAN ASSISTANT

## 2021-11-22 PROCEDURE — 99203 PR OFFICE/OUTPT VISIT, NEW, LEVL III, 30-44 MIN: ICD-10-PCS | Mod: S$GLB,,, | Performed by: PHYSICIAN ASSISTANT

## 2021-11-22 RX ORDER — PREDNISONE 10 MG/1
10 TABLET ORAL DAILY
Qty: 3 TABLET | Refills: 0 | Status: SHIPPED | OUTPATIENT
Start: 2021-11-22 | End: 2021-11-25

## 2021-11-22 RX ORDER — CIPROFLOXACIN AND DEXAMETHASONE 3; 1 MG/ML; MG/ML
4 SUSPENSION/ DROPS AURICULAR (OTIC) 2 TIMES DAILY
Qty: 5 ML | Refills: 0 | Status: SHIPPED | OUTPATIENT
Start: 2021-11-22 | End: 2023-08-08

## 2022-08-22 ENCOUNTER — OFFICE VISIT (OUTPATIENT)
Dept: OBSTETRICS AND GYNECOLOGY | Facility: CLINIC | Age: 52
End: 2022-08-22
Payer: MEDICAID

## 2022-08-22 VITALS
DIASTOLIC BLOOD PRESSURE: 70 MMHG | BODY MASS INDEX: 27.87 KG/M2 | SYSTOLIC BLOOD PRESSURE: 114 MMHG | WEIGHT: 162.38 LBS

## 2022-08-22 DIAGNOSIS — R10.2 PELVIC PAIN: ICD-10-CM

## 2022-08-22 DIAGNOSIS — D21.9 FIBROIDS: Primary | ICD-10-CM

## 2022-08-22 DIAGNOSIS — Z01.419 GYNECOLOGIC EXAM NORMAL: ICD-10-CM

## 2022-08-22 PROCEDURE — 3008F BODY MASS INDEX DOCD: CPT | Mod: CPTII,,, | Performed by: OBSTETRICS & GYNECOLOGY

## 2022-08-22 PROCEDURE — 3074F PR MOST RECENT SYSTOLIC BLOOD PRESSURE < 130 MM HG: ICD-10-PCS | Mod: CPTII,,, | Performed by: OBSTETRICS & GYNECOLOGY

## 2022-08-22 PROCEDURE — 3078F PR MOST RECENT DIASTOLIC BLOOD PRESSURE < 80 MM HG: ICD-10-PCS | Mod: CPTII,,, | Performed by: OBSTETRICS & GYNECOLOGY

## 2022-08-22 PROCEDURE — 99999 PR PBB SHADOW E&M-EST. PATIENT-LVL III: CPT | Mod: PBBFAC,,, | Performed by: OBSTETRICS & GYNECOLOGY

## 2022-08-22 PROCEDURE — 99396 PREV VISIT EST AGE 40-64: CPT | Mod: S$PBB,,, | Performed by: OBSTETRICS & GYNECOLOGY

## 2022-08-22 PROCEDURE — 99396 PR PREVENTIVE VISIT,EST,40-64: ICD-10-PCS | Mod: S$PBB,,, | Performed by: OBSTETRICS & GYNECOLOGY

## 2022-08-22 PROCEDURE — 1159F MED LIST DOCD IN RCRD: CPT | Mod: CPTII,,, | Performed by: OBSTETRICS & GYNECOLOGY

## 2022-08-22 PROCEDURE — 3008F PR BODY MASS INDEX (BMI) DOCUMENTED: ICD-10-PCS | Mod: CPTII,,, | Performed by: OBSTETRICS & GYNECOLOGY

## 2022-08-22 PROCEDURE — 1159F PR MEDICATION LIST DOCUMENTED IN MEDICAL RECORD: ICD-10-PCS | Mod: CPTII,,, | Performed by: OBSTETRICS & GYNECOLOGY

## 2022-08-22 PROCEDURE — 3078F DIAST BP <80 MM HG: CPT | Mod: CPTII,,, | Performed by: OBSTETRICS & GYNECOLOGY

## 2022-08-22 PROCEDURE — 3074F SYST BP LT 130 MM HG: CPT | Mod: CPTII,,, | Performed by: OBSTETRICS & GYNECOLOGY

## 2022-08-22 PROCEDURE — 99213 OFFICE O/P EST LOW 20 MIN: CPT | Mod: PBBFAC | Performed by: OBSTETRICS & GYNECOLOGY

## 2022-08-22 PROCEDURE — 99999 PR PBB SHADOW E&M-EST. PATIENT-LVL III: ICD-10-PCS | Mod: PBBFAC,,, | Performed by: OBSTETRICS & GYNECOLOGY

## 2022-08-22 NOTE — LETTER
August 22, 2022      [facility]  [address]  [place] [address]  Phone: [phone]       Patient: Di Perkins   YOB: 1970  Date of Visit: 08/22/2022    To Whom It May Concern:    Shweta Perkins  was at [facility] on 08/22/2022. The patient may return to work on 08/24/2022 with no restrictions. If you have any questions or concerns, or if I can be of further assistance, please do not hesitate to contact me.    Sincerely,    Erich Zhu MD

## 2022-08-22 NOTE — PROGRESS NOTES
Subjective:       Patient ID: Di Perkins is a 51 y.o. female.    Chief Complaint:  Well Woman      History of Present Illness  HPI  Annual Exam-Premenopausal  Patient presents for annual exam.  The patient is sexually active. GYN screening history: last pap: approximate date 2021 and was normal. The patient wears seatbelts: yes. The patient participates in regular exercise: not asked. Has the patient ever been transfused or tattooed?: not asked. The patient reports that there is not domestic violence in her life.    Colonoscopy:  Never  MM2020    Pt has been having N/V/D since 22.  Pt seen in ER today but only had blood and urine checked.    Pt has know fibroids, last pelvic sono .  Pt c/o some discomfort with intercourse.  Pt states LMP 2022.    GYN & OB History  No LMP recorded.   Date of Last Pap: 2021    OB History    Para Term  AB Living   3 3 2 1   2   SAB IAB Ectopic Multiple Live Births                  # Outcome Date GA Lbr Erwni/2nd Weight Sex Delivery Anes PTL Lv   3 Term            2 Term            1                Obstetric Comments   Gynhx: reg/5. Not heavy. Mild cramp   Sexually active but not using protection   H/o myomectomy in    H/o abnl pap, did not require treatment. Last pap 2015 neg   Denies STD       Review of Systems  Review of Systems   Constitutional: Negative.    Respiratory: Negative.    Cardiovascular: Negative.    Gastrointestinal: Negative.    Endocrine: Negative.    Genitourinary: Positive for dysmenorrhea, dyspareunia and menorrhagia. Negative for bladder incontinence, decreased libido, dysuria, flank pain, frequency, genital sores, hematuria, hot flashes, menstrual problem, pelvic pain, urgency, vaginal bleeding, vaginal discharge, vaginal pain, urinary incontinence, postcoital bleeding, postmenopausal bleeding, vaginal dryness and vaginal odor.   Musculoskeletal: Negative.    Neurological: Negative.    Hematological:  Negative.    Psychiatric/Behavioral: Negative.    Breast: negative.            Objective:    Physical Exam:   Constitutional: She is oriented to person, place, and time. She appears well-developed and well-nourished. No distress.    HENT:   Head: Normocephalic and atraumatic.       Pulmonary/Chest: Effort normal. No respiratory distress. Right breast exhibits no inverted nipple, no mass, no nipple discharge, no skin change, no tenderness, presence, no bleeding, no swelling, no mastectomy, no augmentation and no lumpectomy. Left breast exhibits no inverted nipple, no mass, no nipple discharge, no skin change, no tenderness, presence, no bleeding, no swelling, no mastectomy, no augmentation and no lumpectomy.        Abdominal: Soft. She exhibits no distension and no mass. There is no abdominal tenderness. There is no rebound and no guarding.     Genitourinary:    Vagina, right adnexa and left adnexa normal.      Pelvic exam was performed with patient supine.   The external female genitalia was normal.   No external genitalia lesions identified,Genitalia hair distrobution normal .   Labial bartholins normal.There is no rash, tenderness, lesion or injury on the right labia. There is no rash, tenderness, lesion or injury on the left labia. Cervix is normal. Right adnexum displays no mass, no tenderness and no fullness. Left adnexum displays no mass and no fullness. Vagina exhibits no lesion. No erythema,  no vaginal discharge, tenderness, bleeding, rectocele, cystocele or unspecified prolapse of vaginal walls in the vagina.    No foreign body in the vagina.      No signs of injury in the vagina.   Cervix exhibits no motion tenderness, no discharge, no friability, no tenderness and no polyp. Uterus is enlarged and hosting fibroids. Uterus is not deviated, not fixed, not tender and no uterine prolapse. Normal urethral meatus.Urethral Meatus exhibits: urethral lesion and prolapsedUrethra findings: no urethral mass, no  tenderness and no urethral scarringBladder findings: no bladder distention and no bladder tenderness          Musculoskeletal: Normal range of motion and moves all extremeties. No tenderness.       Neurological: She is alert and oriented to person, place, and time. No cranial nerve deficit. Coordination normal.    Skin: She is not diaphoretic.    Psychiatric: She has a normal mood and affect. Her behavior is normal. Judgment and thought content normal.          Assessment:        1. Fibroids    2. Pelvic pain    3. Gynecologic exam normal               Plan:      1.  Pap not indicated at this time  2.  MMG ordered  3.  Pelvic sono ordered

## 2023-05-08 ENCOUNTER — OFFICE VISIT (OUTPATIENT)
Dept: OBSTETRICS AND GYNECOLOGY | Facility: CLINIC | Age: 53
End: 2023-05-08
Payer: COMMERCIAL

## 2023-05-08 VITALS — DIASTOLIC BLOOD PRESSURE: 74 MMHG | BODY MASS INDEX: 28 KG/M2 | SYSTOLIC BLOOD PRESSURE: 114 MMHG | WEIGHT: 163.13 LBS

## 2023-05-08 DIAGNOSIS — Z13.0 SCREENING FOR DEFICIENCY ANEMIA: ICD-10-CM

## 2023-05-08 DIAGNOSIS — N95.1 MENOPAUSAL SYMPTOMS: Primary | ICD-10-CM

## 2023-05-08 DIAGNOSIS — D21.9 FIBROIDS: ICD-10-CM

## 2023-05-08 DIAGNOSIS — Z13.220 SCREENING CHOLESTEROL LEVEL: ICD-10-CM

## 2023-05-08 DIAGNOSIS — Z01.419 ROUTINE GYNECOLOGICAL EXAMINATION: ICD-10-CM

## 2023-05-08 DIAGNOSIS — Z13.1 SCREENING FOR DIABETES MELLITUS: ICD-10-CM

## 2023-05-08 DIAGNOSIS — Z13.29 SCREENING FOR THYROID DISORDER: ICD-10-CM

## 2023-05-08 PROCEDURE — 3078F DIAST BP <80 MM HG: CPT | Mod: CPTII,S$GLB,, | Performed by: OBSTETRICS & GYNECOLOGY

## 2023-05-08 PROCEDURE — 3078F PR MOST RECENT DIASTOLIC BLOOD PRESSURE < 80 MM HG: ICD-10-PCS | Mod: CPTII,,, | Performed by: OBSTETRICS & GYNECOLOGY

## 2023-05-08 PROCEDURE — 1159F PR MEDICATION LIST DOCUMENTED IN MEDICAL RECORD: ICD-10-PCS | Mod: CPTII,,, | Performed by: OBSTETRICS & GYNECOLOGY

## 2023-05-08 PROCEDURE — 99999 PR PBB SHADOW E&M-EST. PATIENT-LVL III: CPT | Mod: PBBFAC,,, | Performed by: OBSTETRICS & GYNECOLOGY

## 2023-05-08 PROCEDURE — 3008F BODY MASS INDEX DOCD: CPT | Mod: CPTII,S$GLB,, | Performed by: OBSTETRICS & GYNECOLOGY

## 2023-05-08 PROCEDURE — 99396 PR PREVENTIVE VISIT,EST,40-64: ICD-10-PCS | Mod: S$PBB,,, | Performed by: OBSTETRICS & GYNECOLOGY

## 2023-05-08 PROCEDURE — 99396 PREV VISIT EST AGE 40-64: CPT | Mod: S$GLB,,, | Performed by: OBSTETRICS & GYNECOLOGY

## 2023-05-08 PROCEDURE — 3008F PR BODY MASS INDEX (BMI) DOCUMENTED: ICD-10-PCS | Mod: CPTII,,, | Performed by: OBSTETRICS & GYNECOLOGY

## 2023-05-08 PROCEDURE — 99213 OFFICE O/P EST LOW 20 MIN: CPT | Mod: PBBFAC | Performed by: OBSTETRICS & GYNECOLOGY

## 2023-05-08 PROCEDURE — 1159F MED LIST DOCD IN RCRD: CPT | Mod: CPTII,S$GLB,, | Performed by: OBSTETRICS & GYNECOLOGY

## 2023-05-08 PROCEDURE — 3074F SYST BP LT 130 MM HG: CPT | Mod: CPTII,S$GLB,, | Performed by: OBSTETRICS & GYNECOLOGY

## 2023-05-08 PROCEDURE — 3074F PR MOST RECENT SYSTOLIC BLOOD PRESSURE < 130 MM HG: ICD-10-PCS | Mod: CPTII,,, | Performed by: OBSTETRICS & GYNECOLOGY

## 2023-05-08 PROCEDURE — 99999 PR PBB SHADOW E&M-EST. PATIENT-LVL III: ICD-10-PCS | Mod: PBBFAC,,, | Performed by: OBSTETRICS & GYNECOLOGY

## 2023-05-08 RX ORDER — PAROXETINE 10 MG/1
10 TABLET, FILM COATED ORAL EVERY MORNING
Qty: 60 TABLET | Refills: 0 | Status: SHIPPED | OUTPATIENT
Start: 2023-05-08 | End: 2023-09-27 | Stop reason: CLARIF

## 2023-05-08 NOTE — LETTER
May 8, 2023    Di Perkins  [address] [facility] [place] [address]             [facility] [place] - OB [facility]  Obstetrics and Gynecology  [address] [place] [address]  Phone: [phone]   May 8, 2023     Patient: Di Perkins   YOB: 1970   Date of Visit: 5/8/2023       To Whom it May Concern:    Di Perkins was seen in my clinic on 5/8/2023. She may return to work on 05/09/2023.    Please excuse her from any classes or work missed.    If you have any questions or concerns, please don't hesitate to call.    Sincerely,         Erich Zhu MD

## 2023-05-08 NOTE — PROGRESS NOTES
Subjective:      Patient ID: Di Perkins is a 52 y.o. female.    Chief Complaint:  No chief complaint on file.      History of Present Illness  HPI  Annual Exam-Postmenopausal  Patient presents for annual exam. The patient has no complaints today. The patient is sexually active. GYN screening history: last pap: approximate date 2021 and was normal. The patient is not taking hormone replacement therapy. Patient denies post-menopausal vaginal bleeding. The patient wears seatbelts: yes. The patient participates in regular exercise: yes. Has the patient ever been transfused or tattooed?: yes. The patient reports that there is not domestic violence in her life.    Pt c/o worsening hot flashes and night sweats.  Pt uses OTC lubricant with sexual activity.  Pt does have hx of Factor V Leiden with DVT during pregnancy.  Pt denies any cardiac issues.    GYN & OB History  No LMP recorded (lmp unknown). Patient is premenopausal.   Date of Last Pap: 2021    OB History    Para Term  AB Living   3 3 2 1   2   SAB IAB Ectopic Multiple Live Births                  # Outcome Date GA Lbr Erwin/2nd Weight Sex Delivery Anes PTL Lv   3 Term            2 Term            1                Obstetric Comments   Gynhx: reg/5. Not heavy. Mild cramp   Sexually active but not using protection   H/o myomectomy in    H/o abnl pap, did not require treatment. Last pap 2015 neg   Denies STD       Review of Systems  Review of Systems   Constitutional: Negative.    Respiratory: Negative.     Cardiovascular: Negative.    Gastrointestinal: Negative.    Genitourinary:  Positive for hot flashes. Negative for bladder incontinence, decreased libido, dysmenorrhea, dyspareunia, dysuria, flank pain, frequency, genital sores, hematuria, menorrhagia, menstrual problem, pelvic pain, urgency, vaginal bleeding, vaginal discharge, vaginal pain, urinary incontinence, postcoital bleeding, postmenopausal bleeding, vaginal dryness  and vaginal odor.   Musculoskeletal:  Positive for arthralgias, leg pain and myalgias.   Neurological: Negative.    Hematological: Negative.    Psychiatric/Behavioral: Negative.     Breast: negative.         Objective:     Physical Exam:   Constitutional: She is oriented to person, place, and time. She appears well-developed and well-nourished. No distress.    HENT:   Head: Normocephalic and atraumatic.     Neck: No thyromegaly present.     Pulmonary/Chest: Effort normal. No respiratory distress. Right breast exhibits no inverted nipple, no mass, no nipple discharge, no skin change, no tenderness, presence, no bleeding, no swelling, no mastectomy, no augmentation and no lumpectomy. Left breast exhibits no inverted nipple, no mass, no nipple discharge, no skin change, no tenderness, presence, no bleeding, no swelling, no mastectomy, no augmentation and no lumpectomy.        Abdominal: Soft. She exhibits no distension and no mass. There is no abdominal tenderness. There is no rebound and no guarding.     Genitourinary:    Vagina, right adnexa and left adnexa normal.      Pelvic exam was performed with patient supine.   The external female genitalia was normal.   No external genitalia lesions identified,Genitalia hair distrobution normal .   Labial bartholins normal.There is no rash, tenderness, lesion or injury on the right labia. There is no rash, tenderness, lesion or injury on the left labia. Cervix is normal. No no adexnal prolapse, no nodularity or no masses or organomegaly. Right adnexum displays no mass, no tenderness and no fullness. Left adnexum displays no mass, no tenderness and no fullness. Vagina exhibits no lesion. No erythema,  no vaginal discharge, tenderness, bleeding, rectocele, cystocele or unspecified prolapse of vaginal walls in the vagina.    No foreign body in the vagina.      No signs of injury in the vagina.   Vagina was moist.Cervix exhibits no motion tenderness, no lesion, no discharge, no  friability, no lesion, no tenderness and no polyp. Uterus is enlarged and hosting fibroids. Uterus is not deviated, not fixed, not tender and no uterine prolapse. Normal urethral meatus.Urethral Meatus exhibits: urethral lesion and prolapsedUrethra findings: no urethral mass, no tenderness and no urethral scarringBladder findings: no bladder distention and no bladder tenderness          Musculoskeletal: Normal range of motion and moves all extremeties.       Neurological: She is alert and oriented to person, place, and time. No cranial nerve deficit. Coordination normal.    Skin: She is not diaphoretic.    Psychiatric: She has a normal mood and affect. Her behavior is normal. Judgment and thought content normal.       Assessment:     1. Menopausal symptoms    2. Routine gynecological examination    3. Fibroids    4. Screening cholesterol level    5. Screening for thyroid disorder    6. Screening for deficiency anemia    7. Screening for diabetes mellitus              Plan:      MMG ordered 8/2022, pt needs to schedule  Pap not indicated at this time  Pt desires screening labs:  CBC, CMP, TSH, HgA1c and lipids  Rx paroxetine 10 mg qd for menopausal sx's; pt is not a candidate for estrogen HRT due to hx of DVT due to Factor V Leiden

## 2023-08-08 ENCOUNTER — LAB VISIT (OUTPATIENT)
Dept: LAB | Facility: HOSPITAL | Age: 53
End: 2023-08-08
Attending: STUDENT IN AN ORGANIZED HEALTH CARE EDUCATION/TRAINING PROGRAM
Payer: COMMERCIAL

## 2023-08-08 ENCOUNTER — OFFICE VISIT (OUTPATIENT)
Dept: FAMILY MEDICINE | Facility: CLINIC | Age: 53
End: 2023-08-08
Payer: COMMERCIAL

## 2023-08-08 VITALS
HEIGHT: 64 IN | WEIGHT: 176.56 LBS | RESPIRATION RATE: 16 BRPM | HEART RATE: 73 BPM | DIASTOLIC BLOOD PRESSURE: 66 MMHG | BODY MASS INDEX: 30.14 KG/M2 | OXYGEN SATURATION: 96 % | TEMPERATURE: 98 F | SYSTOLIC BLOOD PRESSURE: 132 MMHG

## 2023-08-08 DIAGNOSIS — Z00.00 ANNUAL PHYSICAL EXAM: ICD-10-CM

## 2023-08-08 DIAGNOSIS — N95.1 PERIMENOPAUSAL: Primary | ICD-10-CM

## 2023-08-08 DIAGNOSIS — N95.1 PERIMENOPAUSAL: ICD-10-CM

## 2023-08-08 DIAGNOSIS — G89.29 CHRONIC PAIN OF LEFT KNEE: ICD-10-CM

## 2023-08-08 DIAGNOSIS — Z11.59 ENCOUNTER FOR HCV SCREENING TEST FOR LOW RISK PATIENT: ICD-10-CM

## 2023-08-08 DIAGNOSIS — Z12.12 ENCOUNTER FOR COLORECTAL CANCER SCREENING: ICD-10-CM

## 2023-08-08 DIAGNOSIS — M23.207 OLD TEAR OF MENISCUS OF LEFT KNEE, UNSPECIFIED MENISCUS, UNSPECIFIED TEAR TYPE: ICD-10-CM

## 2023-08-08 DIAGNOSIS — M25.562 CHRONIC PAIN OF LEFT KNEE: ICD-10-CM

## 2023-08-08 DIAGNOSIS — Z23 NEED FOR TDAP VACCINATION: ICD-10-CM

## 2023-08-08 DIAGNOSIS — Z12.31 ENCOUNTER FOR SCREENING MAMMOGRAM FOR BREAST CANCER: ICD-10-CM

## 2023-08-08 DIAGNOSIS — Z12.11 ENCOUNTER FOR COLORECTAL CANCER SCREENING: ICD-10-CM

## 2023-08-08 DIAGNOSIS — Z11.4 ENCOUNTER FOR SCREENING FOR HIV: ICD-10-CM

## 2023-08-08 LAB
ALBUMIN SERPL BCP-MCNC: 4.2 G/DL (ref 3.5–5.2)
ALP SERPL-CCNC: 63 U/L (ref 55–135)
ALT SERPL W/O P-5'-P-CCNC: 17 U/L (ref 10–44)
ANION GAP SERPL CALC-SCNC: 7 MMOL/L (ref 8–16)
AST SERPL-CCNC: 22 U/L (ref 10–40)
BASOPHILS # BLD AUTO: 0.03 K/UL (ref 0–0.2)
BASOPHILS NFR BLD: 0.5 % (ref 0–1.9)
BILIRUB SERPL-MCNC: 0.4 MG/DL (ref 0.1–1)
BUN SERPL-MCNC: 12 MG/DL (ref 6–20)
CALCIUM SERPL-MCNC: 9.3 MG/DL (ref 8.7–10.5)
CHLORIDE SERPL-SCNC: 105 MMOL/L (ref 95–110)
CHOLEST SERPL-MCNC: 247 MG/DL (ref 120–199)
CHOLEST/HDLC SERPL: 3.3 {RATIO} (ref 2–5)
CO2 SERPL-SCNC: 28 MMOL/L (ref 23–29)
CREAT SERPL-MCNC: 0.8 MG/DL (ref 0.5–1.4)
DIFFERENTIAL METHOD: ABNORMAL
EOSINOPHIL # BLD AUTO: 0.1 K/UL (ref 0–0.5)
EOSINOPHIL NFR BLD: 2.3 % (ref 0–8)
ERYTHROCYTE [DISTWIDTH] IN BLOOD BY AUTOMATED COUNT: 14 % (ref 11.5–14.5)
EST. GFR  (NO RACE VARIABLE): >60 ML/MIN/1.73 M^2
GLUCOSE SERPL-MCNC: 83 MG/DL (ref 70–110)
HCT VFR BLD AUTO: 39.7 % (ref 37–48.5)
HDLC SERPL-MCNC: 75 MG/DL (ref 40–75)
HDLC SERPL: 30.4 % (ref 20–50)
HGB BLD-MCNC: 12.2 G/DL (ref 12–16)
IMM GRANULOCYTES # BLD AUTO: 0.01 K/UL (ref 0–0.04)
IMM GRANULOCYTES NFR BLD AUTO: 0.2 % (ref 0–0.5)
LDLC SERPL CALC-MCNC: 160.8 MG/DL (ref 63–159)
LYMPHOCYTES # BLD AUTO: 2.1 K/UL (ref 1–4.8)
LYMPHOCYTES NFR BLD: 36.5 % (ref 18–48)
MCH RBC QN AUTO: 27.2 PG (ref 27–31)
MCHC RBC AUTO-ENTMCNC: 30.7 G/DL (ref 32–36)
MCV RBC AUTO: 89 FL (ref 82–98)
MONOCYTES # BLD AUTO: 0.3 K/UL (ref 0.3–1)
MONOCYTES NFR BLD: 5 % (ref 4–15)
NEUTROPHILS # BLD AUTO: 3.2 K/UL (ref 1.8–7.7)
NEUTROPHILS NFR BLD: 55.5 % (ref 38–73)
NONHDLC SERPL-MCNC: 172 MG/DL
NRBC BLD-RTO: 0 /100 WBC
PLATELET # BLD AUTO: 192 K/UL (ref 150–450)
PMV BLD AUTO: 10.2 FL (ref 9.2–12.9)
POTASSIUM SERPL-SCNC: 4.3 MMOL/L (ref 3.5–5.1)
PROT SERPL-MCNC: 7.5 G/DL (ref 6–8.4)
RBC # BLD AUTO: 4.48 M/UL (ref 4–5.4)
SODIUM SERPL-SCNC: 140 MMOL/L (ref 136–145)
TRIGL SERPL-MCNC: 56 MG/DL (ref 30–150)
TSH SERPL DL<=0.005 MIU/L-ACNC: 1.91 UIU/ML (ref 0.4–4)
WBC # BLD AUTO: 5.76 K/UL (ref 3.9–12.7)

## 2023-08-08 PROCEDURE — 80061 LIPID PANEL: CPT | Performed by: STUDENT IN AN ORGANIZED HEALTH CARE EDUCATION/TRAINING PROGRAM

## 2023-08-08 PROCEDURE — 99999 PR PBB SHADOW E&M-EST. PATIENT-LVL V: ICD-10-PCS | Mod: PBBFAC,,, | Performed by: STUDENT IN AN ORGANIZED HEALTH CARE EDUCATION/TRAINING PROGRAM

## 2023-08-08 PROCEDURE — 3078F DIAST BP <80 MM HG: CPT | Mod: CPTII,S$GLB,, | Performed by: STUDENT IN AN ORGANIZED HEALTH CARE EDUCATION/TRAINING PROGRAM

## 2023-08-08 PROCEDURE — 3075F SYST BP GE 130 - 139MM HG: CPT | Mod: CPTII,S$GLB,, | Performed by: STUDENT IN AN ORGANIZED HEALTH CARE EDUCATION/TRAINING PROGRAM

## 2023-08-08 PROCEDURE — 84443 ASSAY THYROID STIM HORMONE: CPT | Performed by: STUDENT IN AN ORGANIZED HEALTH CARE EDUCATION/TRAINING PROGRAM

## 2023-08-08 PROCEDURE — 3075F PR MOST RECENT SYSTOLIC BLOOD PRESS GE 130-139MM HG: ICD-10-PCS | Mod: CPTII,S$GLB,, | Performed by: STUDENT IN AN ORGANIZED HEALTH CARE EDUCATION/TRAINING PROGRAM

## 2023-08-08 PROCEDURE — 3008F PR BODY MASS INDEX (BMI) DOCUMENTED: ICD-10-PCS | Mod: CPTII,S$GLB,, | Performed by: STUDENT IN AN ORGANIZED HEALTH CARE EDUCATION/TRAINING PROGRAM

## 2023-08-08 PROCEDURE — 99204 PR OFFICE/OUTPT VISIT, NEW, LEVL IV, 45-59 MIN: ICD-10-PCS | Mod: S$GLB,,, | Performed by: STUDENT IN AN ORGANIZED HEALTH CARE EDUCATION/TRAINING PROGRAM

## 2023-08-08 PROCEDURE — 3044F PR MOST RECENT HEMOGLOBIN A1C LEVEL <7.0%: ICD-10-PCS | Mod: CPTII,S$GLB,, | Performed by: STUDENT IN AN ORGANIZED HEALTH CARE EDUCATION/TRAINING PROGRAM

## 2023-08-08 PROCEDURE — 3078F PR MOST RECENT DIASTOLIC BLOOD PRESSURE < 80 MM HG: ICD-10-PCS | Mod: CPTII,S$GLB,, | Performed by: STUDENT IN AN ORGANIZED HEALTH CARE EDUCATION/TRAINING PROGRAM

## 2023-08-08 PROCEDURE — 99204 OFFICE O/P NEW MOD 45 MIN: CPT | Mod: S$GLB,,, | Performed by: STUDENT IN AN ORGANIZED HEALTH CARE EDUCATION/TRAINING PROGRAM

## 2023-08-08 PROCEDURE — 83036 HEMOGLOBIN GLYCOSYLATED A1C: CPT | Performed by: STUDENT IN AN ORGANIZED HEALTH CARE EDUCATION/TRAINING PROGRAM

## 2023-08-08 PROCEDURE — 80053 COMPREHEN METABOLIC PANEL: CPT | Performed by: STUDENT IN AN ORGANIZED HEALTH CARE EDUCATION/TRAINING PROGRAM

## 2023-08-08 PROCEDURE — 36415 COLL VENOUS BLD VENIPUNCTURE: CPT | Performed by: STUDENT IN AN ORGANIZED HEALTH CARE EDUCATION/TRAINING PROGRAM

## 2023-08-08 PROCEDURE — 83001 ASSAY OF GONADOTROPIN (FSH): CPT | Performed by: STUDENT IN AN ORGANIZED HEALTH CARE EDUCATION/TRAINING PROGRAM

## 2023-08-08 PROCEDURE — 99999 PR PBB SHADOW E&M-EST. PATIENT-LVL V: CPT | Mod: PBBFAC,,, | Performed by: STUDENT IN AN ORGANIZED HEALTH CARE EDUCATION/TRAINING PROGRAM

## 2023-08-08 PROCEDURE — 3044F HG A1C LEVEL LT 7.0%: CPT | Mod: CPTII,S$GLB,, | Performed by: STUDENT IN AN ORGANIZED HEALTH CARE EDUCATION/TRAINING PROGRAM

## 2023-08-08 PROCEDURE — 3008F BODY MASS INDEX DOCD: CPT | Mod: CPTII,S$GLB,, | Performed by: STUDENT IN AN ORGANIZED HEALTH CARE EDUCATION/TRAINING PROGRAM

## 2023-08-08 PROCEDURE — 86803 HEPATITIS C AB TEST: CPT | Performed by: STUDENT IN AN ORGANIZED HEALTH CARE EDUCATION/TRAINING PROGRAM

## 2023-08-08 PROCEDURE — 87389 HIV-1 AG W/HIV-1&-2 AB AG IA: CPT | Performed by: STUDENT IN AN ORGANIZED HEALTH CARE EDUCATION/TRAINING PROGRAM

## 2023-08-08 PROCEDURE — 85025 COMPLETE CBC W/AUTO DIFF WBC: CPT | Performed by: STUDENT IN AN ORGANIZED HEALTH CARE EDUCATION/TRAINING PROGRAM

## 2023-08-08 RX ORDER — MELOXICAM 15 MG/1
15 TABLET ORAL DAILY
Qty: 30 TABLET | Refills: 0 | Status: SHIPPED | OUTPATIENT
Start: 2023-08-08 | End: 2023-09-07

## 2023-08-08 RX ORDER — PHENTERMINE HYDROCHLORIDE 37.5 MG/1
37.5 TABLET ORAL
COMMUNITY
Start: 2023-03-11 | End: 2023-09-27 | Stop reason: CLARIF

## 2023-08-08 RX ORDER — TOPIRAMATE 50 MG/1
50 TABLET, FILM COATED ORAL
COMMUNITY
Start: 2023-03-11 | End: 2023-09-27 | Stop reason: CLARIF

## 2023-08-08 NOTE — LETTER
August 23, 2023    Di Perkins  [address] [facility] [address]             [facility]  Lab  [place] [address]  Phone: [phone]   Dear Ms. Di Perkins:    I have reviewed the results of your recent bloodwork. Everything looks ok except your cholesterol, which is high. Your overall risk of heart attack and stroke is low, so we can continue dietary and exercise changes, or we can start medication. Please let me know if you want me to send the medication for you or we can check again in 6 months.     I look forward to seeing you again soon, if you have any questions in the meantime feel free to write back in [employer] ([place]) or call the office to discuss further. Please don't hesitate to reach out if needed.      Below are the results from your recent visit:    Resulted Orders   Hemoglobin A1C   Result Value Ref Range    Hemoglobin A1C 5.5 4.0 - 5.6 %      Comment:      ADA Screening Guidelines:  5.7-6.4%  Consistent with prediabetes  >or=6.5%  Consistent with diabetes    High levels of fetal hemoglobin interfere with the HbA1C  assay. Heterozygous hemoglobin variants (HbS, HgC, etc)do  not significantly interfere with this assay.   However, presence of multiple variants may affect accuracy.      Estimated Avg Glucose 111 68 - 131 mg/dL    Narrative    Release to patient->Immediate   Lipid Panel   Result Value Ref Range    Cholesterol 247 (H) 120 - 199 mg/dL      Comment:      The National Cholesterol Education Program (NCEP) has set the  following guidelines (reference ranges) for Cholesterol:  Optimal.....................<200 mg/dL  Borderline High.............200-239 mg/dL  High........................> or = 240 mg/dL      Triglycerides 56 30 - 150 mg/dL      Comment:      The National Cholesterol Education Program (NCEP) has set the  following guidelines (reference values) for triglycerides:  Normal......................<150 mg/dL  Borderline  High.............150-199 mg/dL  High........................200-499 mg/dL      HDL 75 40 - 75 mg/dL      Comment:      The National Cholesterol Education Program (NCEP) has set the  following guidelines (reference values) for HDL Cholesterol:  Low...............<40 mg/dL  Optimal...........>60 mg/dL      LDL Cholesterol 160.8 (H) 63.0 - 159.0 mg/dL      Comment:      The National Cholesterol Education Program (NCEP) has set the  following guidelines (reference values) for LDL Cholesterol:  Optimal.......................<130 mg/dL  Borderline High...............130-159 mg/dL  High..........................160-189 mg/dL  Very High.....................>190 mg/dL      HDL/Cholesterol Ratio 30.4 20.0 - 50.0 %    Total Cholesterol/HDL Ratio 3.3 2.0 - 5.0    Non-HDL Cholesterol 172 mg/dL      Comment:      Risk category and Non-HDL cholesterol goals:  Coronary heart disease (CHD)or equivalent (10-year risk of CHD >20%):  Non-HDL cholesterol goal     <130 mg/dL  Two or more CHD risk factors and 10-year risk of CHD <= 20%:  Non-HDL cholesterol goal     <160 mg/dL  0 to 1 CHD risk factor:  Non-HDL cholesterol goal     <190 mg/dL      Narrative    Release to patient->Immediate   TSH   Result Value Ref Range    TSH 1.911 0.400 - 4.000 uIU/mL    Narrative    Release to patient->Immediate   Comprehensive Metabolic Panel   Result Value Ref Range    Sodium 140 136 - 145 mmol/L    Potassium 4.3 3.5 - 5.1 mmol/L    Chloride 105 95 - 110 mmol/L    CO2 28 23 - 29 mmol/L    Glucose 83 70 - 110 mg/dL    BUN 12 6 - 20 mg/dL    Creatinine 0.8 0.5 - 1.4 mg/dL    Calcium 9.3 8.7 - 10.5 mg/dL    Total Protein 7.5 6.0 - 8.4 g/dL    Albumin 4.2 3.5 - 5.2 g/dL    Total Bilirubin 0.4 0.1 - 1.0 mg/dL      Comment:      For infants and newborns, interpretation of results should be based  on gestational age, weight and in agreement with clinical  observations.    Premature Infant recommended reference ranges:  Up to 24 hours.............<8.0  mg/dL  Up to 48 hours............<12.0 mg/dL  3-5 days..................<15.0 mg/dL  6-29 days.................<15.0 mg/dL      Alkaline Phosphatase 63 55 - 135 U/L    AST 22 10 - 40 U/L    ALT 17 10 - 44 U/L    eGFR >60 >60 mL/min/1.73 m^2    Anion Gap 7 (L) 8 - 16 mmol/L    Narrative    Release to patient->Immediate   CBC Auto Differential   Result Value Ref Range    WBC 5.76 3.90 - 12.70 K/uL    RBC 4.48 4.00 - 5.40 M/uL    Hemoglobin 12.2 12.0 - 16.0 g/dL    Hematocrit 39.7 37.0 - 48.5 %    MCV 89 82 - 98 fL    MCH 27.2 27.0 - 31.0 pg    MCHC 30.7 (L) 32.0 - 36.0 g/dL    RDW 14.0 11.5 - 14.5 %    Platelets 192 150 - 450 K/uL    MPV 10.2 9.2 - 12.9 fL    Immature Granulocytes 0.2 0.0 - 0.5 %    Gran # (ANC) 3.2 1.8 - 7.7 K/uL    Immature Grans (Abs) 0.01 0.00 - 0.04 K/uL      Comment:      Mild elevation in immature granulocytes is non specific and   can be seen in a variety of conditions including stress response,   acute inflammation, trauma and pregnancy. Correlation with other   laboratory and clinical findings is essential.      Lymph # 2.1 1.0 - 4.8 K/uL    Mono # 0.3 0.3 - 1.0 K/uL    Eos # 0.1 0.0 - 0.5 K/uL    Baso # 0.03 0.00 - 0.20 K/uL    nRBC 0 0 /100 WBC    Gran % 55.5 38.0 - 73.0 %    Lymph % 36.5 18.0 - 48.0 %    Mono % 5.0 4.0 - 15.0 %    Eosinophil % 2.3 0.0 - 8.0 %    Basophil % 0.5 0.0 - 1.9 %    Differential Method Automated     Narrative    Release to patient->Immediate   HIV 1/2 Ag/Ab (4th Gen)   Result Value Ref Range    HIV 1/2 Ag/Ab Non-reactive Non-reactive    Narrative    Release to patient->Immediate   Hepatitis C Antibody   Result Value Ref Range    Hepatitis C Ab Non-reactive Non-reactive    Narrative    Release to patient->Immediate         Sincerely,        Corrina Pal MD

## 2023-08-08 NOTE — PROGRESS NOTES
Health Maintenance Due   Topic     Hepatitis C Screening  Consult pcp    Lipid Panel  Consult pcp    HIV Screening  Consult pcp    TETANUS VACCINE  Pt decline    Colorectal Cancer Screening  Information received.      Mammogram  Pt will schedule in portal     Shingles Vaccine (1 of 2) Pt decline    COVID-19 Vaccine (4 - Pfizer series) Not offered at this Facility

## 2023-08-08 NOTE — PROGRESS NOTES
SUBJECTIVE     Chief Complaint   Patient presents with    Establish Care    Hypertension    Knee Pain     L        HPI  Di Perkins is a 52 y.o. female with multiple medical diagnoses as listed in the medical history and problem list that presents for establishment of care.    Pt reports that she has been checking her BP. 160s/100s was maximum.     Pt c/o heavy bleeding for a month after 1 year of no cycle. This was in May. She was under a lot of stress. This has not reoccurred.     Pt has hx of left knee pain, Was seen by ortho and told they thought it was a torn meniscus. She had an xray and completed a course of physical therapy, but now the pain has returned and progressively worsened.    PHQ9 score- 16.     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     PAST MEDICAL HISTORY:  Past Medical History:   Diagnosis Date    Deep vein thrombosis 2002    left forearm, due to Factor 5 Leiden    Factor 5 Leiden mutation, heterozygous        ALLERGIES AND MEDICATIONS: updated and reviewed.  Review of patient's allergies indicates:  No Known Allergies  Current Outpatient Medications   Medication Sig Dispense Refill    paroxetine (PAXIL) 10 MG tablet Take 1 tablet (10 mg total) by mouth every morning. (Patient not taking: Reported on 8/8/2023) 60 tablet 0    phentermine (ADIPEX-P) 37.5 mg tablet Take 37.5 mg by mouth.      topiramate (TOPAMAX) 50 MG tablet Take 50 mg by mouth.       No current facility-administered medications for this visit.       ROS  Review of Systems   Constitutional:  Negative for chills, fatigue and fever.   HENT:  Negative for rhinorrhea and sore throat.    Respiratory:  Negative for cough and shortness of breath.    Cardiovascular:  Negative for chest pain and palpitations.   Gastrointestinal:  Negative for constipation, diarrhea, nausea and vomiting.   Genitourinary:  Negative for dysuria.   Musculoskeletal:  Negative for joint swelling.   Skin:  Negative for rash and wound.  "  Neurological:  Negative for light-headedness and headaches.   Psychiatric/Behavioral:  Negative for dysphoric mood and sleep disturbance. The patient is not nervous/anxious.          OBJECTIVE     Physical Exam  Vitals:    08/08/23 1457   BP: 132/66   Pulse: 73   Resp: 16   Temp: 98 °F (36.7 °C)    Body mass index is 30.31 kg/m².  Weight: 80.1 kg (176 lb 9.4 oz)   Height: 5' 4" (162.6 cm)     Physical Exam  Vitals reviewed.   Constitutional:       General: She is not in acute distress.  HENT:      Right Ear: External ear normal.      Left Ear: External ear normal.      Nose: Nose normal.      Mouth/Throat:      Mouth: Mucous membranes are moist.   Eyes:      Extraocular Movements: Extraocular movements intact.      Conjunctiva/sclera: Conjunctivae normal.      Pupils: Pupils are equal, round, and reactive to light.   Pulmonary:      Effort: Pulmonary effort is normal.   Abdominal:      General: There is no distension.      Palpations: Abdomen is soft.   Musculoskeletal:         General: No swelling. Normal range of motion.      Cervical back: Normal range of motion.   Skin:     General: Skin is warm and dry.      Findings: No rash.   Neurological:      General: No focal deficit present.      Mental Status: She is alert and oriented to person, place, and time.   Psychiatric:         Mood and Affect: Mood normal.         Behavior: Behavior normal.           Health Maintenance         Date Due Completion Date    Hepatitis C Screening Never done ---    Lipid Panel Never done ---    HIV Screening Never done ---    TETANUS VACCINE Never done ---    Colorectal Cancer Screening Never done ---    Mammogram 06/10/2021 6/10/2020    Shingles Vaccine (1 of 2) Never done ---    COVID-19 Vaccine (4 - Pfizer series) 01/31/2022 12/6/2021    Influenza Vaccine (1) 09/01/2023 ---    Hemoglobin A1c (Diabetic Prevention Screening) 04/23/2024 4/23/2021    Cervical Cancer Screening 07/13/2026 7/13/2021              ASSESSMENT     52 y.o. " female with     1. Perimenopausal    2. Encounter for screening mammogram for breast cancer    3. Encounter for colorectal cancer screening    4. Need for Tdap vaccination    5. Old tear of meniscus of left knee, unspecified meniscus, unspecified tear type    6. Chronic pain of left knee    7. Annual physical exam    8. Encounter for screening for HIV    9. Encounter for HCV screening test for low risk patient        PLAN:     1. Perimenopausal  - Symptomatic. Check FSH. F/u 6 weeks.  - FOLLICLE STIMULATING HORMONE; Future    2. Encounter for screening mammogram for breast cancer  - Due, ordered.  - Mammo Digital Screening Bilat; Future    3. Encounter for colorectal cancer screening  - Due, ordered.  - Ambulatory referral/consult to Endo Procedure ; Future    4. Need for Tdap vaccination  Due    5. Old tear of meniscus of left knee, unspecified meniscus, unspecified tear type  - Worsening pain. Order MRI and refer to ortho. F/u 6 weeks.  - MRI Knee Without Contrast Left; Future  - Ambulatory referral/consult to Orthopedics; Future    6. Chronic pain of left knee  - Worsening pain. Order MRI and refer to ortho. F/u 6 weeks.  - MRI Knee Without Contrast Left; Future  - Ambulatory referral/consult to Orthopedics; Future    7. Annual physical exam  - Discussed age and gender appropriate screenings at this visit and encouraged a healthy diet low in simple carbohydrates, and increased physical activity.  Counseled on medically appropriate vaccines based on age and current health condition.  Screening test reviewed and discussed with patient.   - Hemoglobin A1C; Future  - Lipid Panel; Future  - TSH; Future  - Comprehensive Metabolic Panel; Future  - CBC Auto Differential; Future    8. Encounter for screening for HIV  - Due, ordered.  - HIV 1/2 Ag/Ab (4th Gen); Future    9. Encounter for HCV screening test for low risk patient  - Due, ordered.  - Hepatitis C Antibody; Future        Corrina Pal MD  08/08/2023  3:24 PM        No follow-ups on file.

## 2023-08-09 DIAGNOSIS — M25.569 KNEE PAIN, UNSPECIFIED CHRONICITY, UNSPECIFIED LATERALITY: Primary | ICD-10-CM

## 2023-08-09 LAB
ESTIMATED AVG GLUCOSE: 111 MG/DL (ref 68–131)
FSH SERPL-ACNC: 64.82 MIU/ML
HBA1C MFR BLD: 5.5 % (ref 4–5.6)
HCV AB SERPL QL IA: NORMAL
HIV 1+2 AB+HIV1 P24 AG SERPL QL IA: NORMAL

## 2023-08-11 ENCOUNTER — OFFICE VISIT (OUTPATIENT)
Dept: ORTHOPEDICS | Facility: CLINIC | Age: 53
End: 2023-08-11
Attending: ORTHOPAEDIC SURGERY
Payer: COMMERCIAL

## 2023-08-11 ENCOUNTER — HOSPITAL ENCOUNTER (OUTPATIENT)
Dept: RADIOLOGY | Facility: HOSPITAL | Age: 53
Discharge: HOME | End: 2023-08-11
Attending: ORTHOPAEDIC SURGERY
Payer: COMMERCIAL

## 2023-08-11 VITALS — HEIGHT: 64 IN | WEIGHT: 176 LBS | BODY MASS INDEX: 30.05 KG/M2

## 2023-08-11 DIAGNOSIS — M25.562 CHRONIC PAIN OF LEFT KNEE: ICD-10-CM

## 2023-08-11 DIAGNOSIS — M25.569 KNEE PAIN, UNSPECIFIED CHRONICITY, UNSPECIFIED LATERALITY: ICD-10-CM

## 2023-08-11 DIAGNOSIS — M23.207 OLD TEAR OF MENISCUS OF LEFT KNEE, UNSPECIFIED MENISCUS, UNSPECIFIED TEAR TYPE: ICD-10-CM

## 2023-08-11 DIAGNOSIS — G89.29 CHRONIC PAIN OF LEFT KNEE: ICD-10-CM

## 2023-08-11 PROCEDURE — 99999 PR PBB SHADOW E&M-EST. PATIENT-LVL III: CPT | Mod: PBBFAC,,, | Performed by: ORTHOPAEDIC SURGERY

## 2023-08-11 PROCEDURE — 1159F PR MEDICATION LIST DOCUMENTED IN MEDICAL RECORD: ICD-10-PCS | Mod: CPTII,S$GLB,, | Performed by: ORTHOPAEDIC SURGERY

## 2023-08-11 PROCEDURE — 1159F MED LIST DOCD IN RCRD: CPT | Mod: CPTII,S$GLB,, | Performed by: ORTHOPAEDIC SURGERY

## 2023-08-11 PROCEDURE — 99214 OFFICE O/P EST MOD 30 MIN: CPT | Mod: S$GLB,,, | Performed by: ORTHOPAEDIC SURGERY

## 2023-08-11 PROCEDURE — 3044F PR MOST RECENT HEMOGLOBIN A1C LEVEL <7.0%: ICD-10-PCS | Mod: CPTII,S$GLB,, | Performed by: ORTHOPAEDIC SURGERY

## 2023-08-11 PROCEDURE — 3044F HG A1C LEVEL LT 7.0%: CPT | Mod: CPTII,S$GLB,, | Performed by: ORTHOPAEDIC SURGERY

## 2023-08-11 PROCEDURE — 3008F PR BODY MASS INDEX (BMI) DOCUMENTED: ICD-10-PCS | Mod: CPTII,S$GLB,, | Performed by: ORTHOPAEDIC SURGERY

## 2023-08-11 PROCEDURE — 3008F BODY MASS INDEX DOCD: CPT | Mod: CPTII,S$GLB,, | Performed by: ORTHOPAEDIC SURGERY

## 2023-08-11 PROCEDURE — 99999 PR PBB SHADOW E&M-EST. PATIENT-LVL III: ICD-10-PCS | Mod: PBBFAC,,, | Performed by: ORTHOPAEDIC SURGERY

## 2023-08-11 PROCEDURE — 99214 PR OFFICE/OUTPT VISIT, EST, LEVL IV, 30-39 MIN: ICD-10-PCS | Mod: S$GLB,,, | Performed by: ORTHOPAEDIC SURGERY

## 2023-08-11 NOTE — PROGRESS NOTES
NEW PATIENT ORTHOPAEDIC: Knee    PRIMARY CARE PHYSICIAN: Corrina Pal MD   REFERRING PROVIDER: Corrina Pal MD  [address]  [address]  [place] [address]     ASSESSMENT & PLAN:    Impression:  Left Knee Medial meniscal tear   Left Knee Effusion    Follow Up Plan: Will call with MR Results     Non operative care:    Di Perkins has physical exam evidence of above and wishes to pursue an non-operative care. I am recommending the following: MRI, continuation of Mobic    Patient has a 3 year history of having atraumatic onset left knee pain.  This has been evaluated by other orthopedic providers.  She is undergone physical therapy in the past with some improvement in her pain.  However over the last few years she is had intermittent issues and swelling with this knee.  It does intermittently catch or pop but that is not her primary issue.  She reports about a week ago her knee became significantly swollen and she would difficulty ambulating along with problems ranging her knee and a feeling of tightness.  She saw her primary care who placed her on Mobic.  Her swelling has improved after being placed on this but she continues to have medial based knee pain.  She has an MRI scheduled for Monday.  I will follow up with the results of that MRI and if meniscus appears to be the culprit which it sounds like it is I will refer her to one of our sports colleagues for surgical considerations.  She is not interested in more therapy or injection based treatment and would like definitive fixation should the MRI merit it.  If there is no meniscus tear and this is something that is nonoperative I will see her back and developed next steps for treatment.    The patient has been ordered:  MRI (Criteria for MRI - xray is equivocal)    CONSULTS:   None    ACTIVE PROBLEM LIST  Patient Active Problem List   Diagnosis    Breast lump in female    Uterine fibroid    Factor 5 Leiden mutation, heterozygous    History of  "DVT in adulthood    Old tear of medial meniscus of left knee    Swelling of joint of left knee    Quadriceps weakness    Impaired mobility and ADLs           SUBJECTIVE    CHIEF COMPLAINT: Knee Pain    HPI:   Di Perkins is a 52 y.o. female here for evaluation and management of left knee pain. There is not a specific incident that brought about this pain. she has had progressive problems with the knee(s) starting 3 years ago but is now progressing to interfere with activities which include: walking, functional household ADL's, and enjoying hobbies    Currently the pain in the joint is rated at moderate with activity. The pain is intermittent and is located in the knee, located medially and located anterior. The pain is described as aching, sharp, stiffness, and throbbing. Relieving factors include rest, ice or heat, prescription medication, and repositioning.     There is associated Clicking.     Di Perkins has no additional complaints.     Previous seen other orthopaedic providers in 2020 for left knee pain, was given NSAIDs and PT. Per notes, "Onset first noted after getting up from a kneeling position. Pain localized to anterior knee, achy and aggravated with bending, squatting, rising from a chair and stair climbing. Notes associated swelling, locking and stiffness."      PROGRESSIVE SYMPTOMS:  Pain worsened by weight bearing  Pain effecting living situation    FUNCTIONAL STATUS:   Climb a flight of stairs or walk up a hill     PREVIOUS TREATMENTS:  Medical: RX NSAIDS  Physical Therapy: Formal Physical Therapy for 6 weeks  Previous Orthopaedic Surgery: None    REVIEW OF SYSTEMS:  PAIN ASSESSMENT:  See HPI.  MUSCULOSKELETAL: See HPI.  OTHER 10 point review of systems is negative except as stated in HPI above    PAST MEDICAL HISTORY   has a past medical history of Deep vein thrombosis (2002) and Factor 5 Leiden mutation, heterozygous.     PAST SURGICAL HISTORY   has a past surgical history that " includes Uterine fibroid surgery.     FAMILY HISTORY  family history includes Diabetes in her mother.     SOCIAL HISTORY   reports that she has never smoked. She has never used smokeless tobacco. She reports that she does not drink alcohol and does not use drugs.     ALLERGIES   Review of patient's allergies indicates:  No Known Allergies     MEDICATIONS  Current Outpatient Medications on File Prior to Visit   Medication Sig Dispense Refill    meloxicam (MOBIC) 15 MG tablet Take 1 tablet (15 mg total) by mouth once daily. 30 tablet 0    paroxetine (PAXIL) 10 MG tablet Take 1 tablet (10 mg total) by mouth every morning. (Patient not taking: Reported on 8/8/2023) 60 tablet 0    phentermine (ADIPEX-P) 37.5 mg tablet Take 37.5 mg by mouth.      topiramate (TOPAMAX) 50 MG tablet Take 50 mg by mouth.       No current facility-administered medications on file prior to visit.          PHYSICAL EXAM   vitals were not taken for this visit.   There is no height or weight on file to calculate BMI.      All other systems deferred.  GENERAL:  No acute distress  HABITUS: Normal  GAIT: Non-antalgic  SKIN: Normal     KNEE EXAM:    left:   Effusion: Small joint effusion  TTP: Yes over Medial Joint Line   No crepitus with passive knee ROM  Passive ROM: Extension 0, Flexion 130  No pain with manipulation of patella  Stable to varus/valgus stress. No increased laxity to anterior/posterior drawer testing  positive Valarie's test  No pain with IR/ER rotation of the hip  5/5 strength in knee flexion and extension, ankle plantarflexion and dorsiflexion  Neurovascular Status: Sensation intact to light touch in Sural, Saphenous, SPN, DPN, Tibial nerve distribution  2+ pulse DP/PT, normal capillary refill, foot has normal warmth    DATA:  Diagnostic tests reviewed for today's visit:     The obtained knee radiographs appears normal for age. There is good alignment with no evidence of fracture, bony abnormalities or signficant degenerative  changes.

## 2023-08-14 ENCOUNTER — HOSPITAL ENCOUNTER (OUTPATIENT)
Dept: RADIOLOGY | Facility: HOSPITAL | Age: 53
Discharge: HOME | End: 2023-08-14
Attending: STUDENT IN AN ORGANIZED HEALTH CARE EDUCATION/TRAINING PROGRAM
Payer: COMMERCIAL

## 2023-08-14 DIAGNOSIS — M23.207 OLD TEAR OF MENISCUS OF LEFT KNEE, UNSPECIFIED MENISCUS, UNSPECIFIED TEAR TYPE: ICD-10-CM

## 2023-08-14 DIAGNOSIS — M25.562 CHRONIC PAIN OF LEFT KNEE: ICD-10-CM

## 2023-08-14 DIAGNOSIS — G89.29 CHRONIC PAIN OF LEFT KNEE: ICD-10-CM

## 2023-08-14 PROCEDURE — 73721 MRI JNT OF LWR EXTRE W/O DYE: CPT | Mod: TC,LT

## 2023-08-14 PROCEDURE — 73721 MRI KNEE WITHOUT CONTRAST LEFT: ICD-10-PCS | Mod: 26,LT,, | Performed by: RADIOLOGY

## 2023-08-14 PROCEDURE — 73721 MRI JNT OF LWR EXTRE W/O DYE: CPT | Mod: 26,LT,, | Performed by: RADIOLOGY

## 2023-08-16 DIAGNOSIS — M23.207 OLD TEAR OF MENISCUS OF LEFT KNEE, UNSPECIFIED MENISCUS, UNSPECIFIED TEAR TYPE: Primary | ICD-10-CM

## 2023-08-17 NOTE — PROGRESS NOTES
The 10-year ASCVD risk score (Catherine SANFORD, et al., 2019) is: 2%    Values used to calculate the score:      Age: 52 years      Sex: Female      Is Non- : Yes      Diabetic: No      Tobacco smoker: No      Systolic Blood Pressure: 132 mmHg      Is BP treated: No      HDL Cholesterol: 75 mg/dL      Total Cholesterol: 247 mg/dL

## 2023-08-17 NOTE — PROGRESS NOTES
CC: Left knee pain    52 y.o. Female presents as a new patient to me for evaluation of left knee pain.  She has had left knee pain for over a year without any associated traumatic event.  This been being managed by her primary care physician.  She states over the last few weeks her left knee pain has worsened acutely without any associated traumatic event.  She endorses significant swelling in the knee.  She endorses pain with weight-bearing.  Mostly her pain is located medially in the knee.  She does endorse the knee locking and unable to be straightened at times.  MRI has shown a medial meniscus tear, complex with flipped undersurface component.  Kindly referred to me by my colleague Dr. Toño Chinchilla to discuss arthroscopy.  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 Thus far her treatment has not oral anti-inflammatories, icing, activity modification, self-directed therapy.    PMHx notable for factor V Leiden.   Negative for tobacco.   Negative for diabetes     Pain Score:   2    REVIEW OF SYSTEMS:   Constitution: Negative. Negative for chills, fever and night sweats.    Hematologic/Lymphatic: Negative for bleeding problem. Does not bruise/bleed easily.   Skin: Negative for dry skin, itching and rash.   Musculoskeletal: Negative for falls. Positive for left knee pain and locking    All other review of symptoms were reviewed and found to be noncontributory.     PAST MEDICAL HISTORY:   Past Medical History:   Diagnosis Date    Deep vein thrombosis 2002    left forearm, due to Factor 5 Leiden    Factor 5 Leiden mutation, heterozygous      PAST SURGICAL HISTORY:   Past Surgical History:   Procedure Laterality Date    UTERINE FIBROID SURGERY       FAMILY HISTORY:   Family History   Problem Relation Age of Onset    Diabetes Mother      SOCIAL HISTORY:   Social History     Socioeconomic History    Marital status:    Tobacco Use    Smoking  "status: Never    Smokeless tobacco: Never   Substance and Sexual Activity    Alcohol use: No    Drug use: No    Sexual activity: Yes     Partners: Male     Birth control/protection: None     MEDICATIONS:     Current Outpatient Medications:     meloxicam (MOBIC) 15 MG tablet, Take 1 tablet (15 mg total) by mouth once daily., Disp: 30 tablet, Rfl: 0    paroxetine (PAXIL) 10 MG tablet, Take 1 tablet (10 mg total) by mouth every morning. (Patient not taking: Reported on 8/8/2023), Disp: 60 tablet, Rfl: 0    phentermine (ADIPEX-P) 37.5 mg tablet, Take 37.5 mg by mouth., Disp: , Rfl:     topiramate (TOPAMAX) 50 MG tablet, Take 50 mg by mouth., Disp: , Rfl:     ALLERGIES:   Review of patient's allergies indicates:  No Known Allergies     PHYSICAL EXAMINATION:  /80   Pulse 65   Ht 5' 4" (1.626 m)   Wt 78 kg (171 lb 15.3 oz)   BMI 29.52 kg/m²   General: Well-developed well-nourished 52 y.o. femalein no acute distress   Cardiovascular: Regular rhythm by palpation of distal pulse, normal color and temperature, no concerning varicosities on symptomatic side   Lungs: No labored breathing or wheezing appreciated   Neuro: Alert and oriented ×3   Psychiatric: well oriented to person, place and time, demonstrates normal mood and affect   Skin: No rashes, lesions or ulcers, normal temperature, turgor, and texture on involved extremity    Ortho/SPM Exam  Examination of the left lower extremity shows intact extensor mechanism.  No signs of trauma.  1+ effusion.  She is tender to palpation about the medial joint line.  Positive McMurrays with pain medially.  Lachman's exam is negative with a firm endpoint.  She has stable varus valgus stress in full extension and 30° of flexion.  Patella tracks in the midline.  No patellar apprehension.  Full extension to 130° of flexion with minimal pain.  She does have medially based pain with forced flexion and extension.    IMAGING:  X-rays including standing, weight bearing AP and flexion " bilateral knees, LEFT knee lateral and sunrise views ordered and images reviewed by me show:    No fracture. Moderate-sized joint effusion. No advanced degenerative change.  Well-maintained joint spaces.    MRI Left knee 8/14/23:  Tear posterior horn medial meniscus, predominantly horizontal with truncation of the posterior horn and mid body free edge     Moderate effusion with suprapatellar plica    On my read there is a flipped undersurface component to the medial meniscus tear with adjacent abrasive wear to the medial tibial plateau edge    ASSESSMENT:      ICD-10-CM ICD-9-CM   1. Old complex tear of medial meniscus of left knee  M23.204 717.3     PLAN:     Left knee complex medial meniscus tear with a flipped undersurface component.  The patient presents with recurrent effusions and mechanical symptoms and pain despite prior conservative treatment.  We discussed treatment options and I think arthroscopy for partial medial meniscectomy and chondroplasty as indicated would be her most reliable and durable treatment options for this.  The details of surgery were discussed to include the expected postop rehab and recovery course.  She does wish to proceed.  All questions answered.    Plan is Left knee arthroscopic partial medial meniscectomy and chondroplasty as indicated    History of factor 5 Leiden mutation.  We will place her on Eliquis 2.5 mg twice a day for 2 weeks postop for DVT prophylaxis.    Informed Consent:    The details of the surgical procedure were explained, including the location of probable incisions and a description of possible hardware and/or grafts to be used. Alternatives to both operative and non-operative options with associated risks and benefits were discussed. The patient understands the likely convalescence after surgery and, in particular, the expected postop rehab and recovery course. The outlined risks and potential complications of the proposed procedure include but are not limited  to: infection, poor wound healing, scarring, deformity, stiffness, swelling, continued or recurrent pain, instability, hardware or prosthetic failure if implanted, symptomatic hardware requiring removal, dislocation, weakness, neurovascular injury, numbness, chronic regional pain disorder, tissue nonhealing/irreparability/retear, subsequent contralateral limb injury or pathology, chondral injury, arthritis, fracture, blood clot formation, inability to return to previous level of activity, anesthetic or regional block complication up to death, need for additional procedure as indicated intraoperatively, and potential need for further surgery.    The patient was also informed and understands that the risks of surgery are greater for patients with a current condition or history of heart disease, obesity, clotting disorders, recurrent infections, steroid use, current or past smoking, and factors such as sedentary lifestyle and noncompliance with medications, therapy or follow-up. The degree of the increased risk is hard to estimate with any degree of precision. If applicable, smoking cessation was discussed.     All questions were answered. The patient has verbalized understanding of these issues and wishes to proceed with the surgery as discussed.    Procedures

## 2023-08-21 ENCOUNTER — TELEPHONE (OUTPATIENT)
Dept: OBSTETRICS AND GYNECOLOGY | Facility: CLINIC | Age: 53
End: 2023-08-21
Payer: COMMERCIAL

## 2023-08-21 NOTE — TELEPHONE ENCOUNTER
Pt was call and left a voicemail.        ----- Message from Erich Zhu MD sent at 8/9/2023  9:12 PM CDT -----  Pt needs to see PCP for elevated cholesterol

## 2023-08-22 ENCOUNTER — OFFICE VISIT (OUTPATIENT)
Dept: SPORTS MEDICINE | Facility: CLINIC | Age: 53
End: 2023-08-22
Payer: COMMERCIAL

## 2023-08-22 VITALS
HEART RATE: 65 BPM | SYSTOLIC BLOOD PRESSURE: 123 MMHG | BODY MASS INDEX: 29.35 KG/M2 | DIASTOLIC BLOOD PRESSURE: 80 MMHG | WEIGHT: 171.94 LBS | HEIGHT: 64 IN

## 2023-08-22 DIAGNOSIS — M23.204 OLD COMPLEX TEAR OF MEDIAL MENISCUS OF LEFT KNEE: ICD-10-CM

## 2023-08-22 PROCEDURE — 3079F DIAST BP 80-89 MM HG: CPT | Mod: CPTII,S$GLB,, | Performed by: ORTHOPAEDIC SURGERY

## 2023-08-22 PROCEDURE — 3074F PR MOST RECENT SYSTOLIC BLOOD PRESSURE < 130 MM HG: ICD-10-PCS | Mod: CPTII,S$GLB,, | Performed by: ORTHOPAEDIC SURGERY

## 2023-08-22 PROCEDURE — 3074F SYST BP LT 130 MM HG: CPT | Mod: CPTII,S$GLB,, | Performed by: ORTHOPAEDIC SURGERY

## 2023-08-22 PROCEDURE — 99204 PR OFFICE/OUTPT VISIT, NEW, LEVL IV, 45-59 MIN: ICD-10-PCS | Mod: S$GLB,,, | Performed by: ORTHOPAEDIC SURGERY

## 2023-08-22 PROCEDURE — 99999 PR PBB SHADOW E&M-EST. PATIENT-LVL III: ICD-10-PCS | Mod: PBBFAC,,, | Performed by: ORTHOPAEDIC SURGERY

## 2023-08-22 PROCEDURE — 1159F MED LIST DOCD IN RCRD: CPT | Mod: CPTII,S$GLB,, | Performed by: ORTHOPAEDIC SURGERY

## 2023-08-22 PROCEDURE — 3044F HG A1C LEVEL LT 7.0%: CPT | Mod: CPTII,S$GLB,, | Performed by: ORTHOPAEDIC SURGERY

## 2023-08-22 PROCEDURE — 3044F PR MOST RECENT HEMOGLOBIN A1C LEVEL <7.0%: ICD-10-PCS | Mod: CPTII,S$GLB,, | Performed by: ORTHOPAEDIC SURGERY

## 2023-08-22 PROCEDURE — 1159F PR MEDICATION LIST DOCUMENTED IN MEDICAL RECORD: ICD-10-PCS | Mod: CPTII,S$GLB,, | Performed by: ORTHOPAEDIC SURGERY

## 2023-08-22 PROCEDURE — 3079F PR MOST RECENT DIASTOLIC BLOOD PRESSURE 80-89 MM HG: ICD-10-PCS | Mod: CPTII,S$GLB,, | Performed by: ORTHOPAEDIC SURGERY

## 2023-08-22 PROCEDURE — 99204 OFFICE O/P NEW MOD 45 MIN: CPT | Mod: S$GLB,,, | Performed by: ORTHOPAEDIC SURGERY

## 2023-08-22 PROCEDURE — 3008F BODY MASS INDEX DOCD: CPT | Mod: CPTII,S$GLB,, | Performed by: ORTHOPAEDIC SURGERY

## 2023-08-22 PROCEDURE — 3008F PR BODY MASS INDEX (BMI) DOCUMENTED: ICD-10-PCS | Mod: CPTII,S$GLB,, | Performed by: ORTHOPAEDIC SURGERY

## 2023-08-22 PROCEDURE — 99999 PR PBB SHADOW E&M-EST. PATIENT-LVL III: CPT | Mod: PBBFAC,,, | Performed by: ORTHOPAEDIC SURGERY

## 2023-08-24 DIAGNOSIS — M23.204 OLD TEAR OF MEDIAL MENISCUS OF LEFT KNEE, UNSPECIFIED TEAR TYPE: Primary | ICD-10-CM

## 2023-09-25 ENCOUNTER — PATIENT MESSAGE (OUTPATIENT)
Dept: PREADMISSION TESTING | Facility: HOSPITAL | Age: 53
End: 2023-09-25
Payer: COMMERCIAL

## 2023-09-25 NOTE — ANESTHESIA PAT ROS NOTE
09/25/2023  Di Perkins is a 52 y.o., female.      Pre-op Assessment    I have reviewed the Patient Summary Reports.       I have reviewed the Medications.     Review of Systems  Anesthesia Hx:  No problems with previous Anesthesia               Denies Personal Hx of Anesthesia complications.                    Social:  Non-Smoker, No Alcohol Use       Hematology/Oncology:    Oncology Normal                Hematology Comments: H/O Factor 5 Leiden mutation, heterozygous, DVT left forearm,  Surgeon will use Eliquis for post-op DVT prophylaxis                    EENT/Dental:  EENT/Dental Normal           Cardiovascular:  Cardiovascular Normal Exercise tolerance: good       Denies CAD.       Denies Angina.       Denies KNOTT.                            Pulmonary:  Pulmonary Normal    Denies Asthma.   Denies Shortness of breath.                  Renal/:  Renal/ Normal  Denies Chronic Renal Disease.                Hepatic/GI:  Hepatic/GI Normal     Denies GERD. Denies Liver Disease.            Musculoskeletal:     Old tear of medial meniscus of left knee,  Swelling of joint of left knee,  Quadriceps weakness              OB/GYN/PEDS:  H/O uterine fibroids, S/P myomectomy in 2000           Neurological:  Neurology Normal   Denies CVA.    Denies Seizures.                                Endocrine:  Endocrine Normal Denies Diabetes. Denies Hypothyroidism.          Dermatological:  Skin Normal    Psych:  Psychiatric Normal                   Past Medical History:   Diagnosis Date    Deep vein thrombosis 2002    left forearm, due to Factor 5 Leiden    Factor 5 Leiden mutation, heterozygous      Past Surgical History:   Procedure Laterality Date    UTERINE FIBROID SURGERY         Anesthesia Assessment: Preoperative EQUATION    Planned Procedure: Procedure(s) (LRB):  ARTHROSCOPY, KNEE, WITH MENISCECTOMY  (Left)  Requested Anesthesia Type:General  Surgeon: MAYELA Sanderson MD  Service: Orthopedics  Known or anticipated Date of Surgery:9/29/2023    Surgeon notes: reviewed    Electronic QUestionnaire Assessment completed via nurse interview with patient.        Triage considerations:     The patient has no apparent active cardiac condition (No unstable coronary Syndrome such as severe unstable angina or recent [<1 month] myocardial infarction, decompensated CHF, severe valvular   disease or significant arrhythmia)    Previous anesthesia records:No problems and Not available    Last PCP note: within 3 months , within [facility]       Other important co-morbidities:  No co-morbidities noted        EKG 10/20/2016:  Vent. Rate : 070 BPM     Atrial Rate : 070 BPM      P-R Int : 180 ms          QRS Dur : 072 ms       QT Int : 390 ms       P-R-T Axes : 028 018 001 degrees      QTc Int : 421 ms   Normal sinus rhythm   Normal ECG   No previous ECGs available   Confirmed by Richard Kong MD ([id number]) on 10/24/2016 10:37:47 AM                Instructions given. (See in Nurse's note)      Ht: 5'4  Wt: 171 lb  BMI: 29.52  Vaccinated

## 2023-09-27 ENCOUNTER — OFFICE VISIT (OUTPATIENT)
Dept: SPORTS MEDICINE | Facility: CLINIC | Age: 53
End: 2023-09-27
Payer: COMMERCIAL

## 2023-09-27 VITALS
HEIGHT: 64 IN | DIASTOLIC BLOOD PRESSURE: 78 MMHG | BODY MASS INDEX: 29.74 KG/M2 | SYSTOLIC BLOOD PRESSURE: 119 MMHG | WEIGHT: 174.19 LBS | HEART RATE: 70 BPM

## 2023-09-27 DIAGNOSIS — S83.242S TEAR OF MEDIAL MENISCUS OF LEFT KNEE, UNSPECIFIED TEAR TYPE, UNSPECIFIED WHETHER OLD OR CURRENT TEAR, SEQUELA: ICD-10-CM

## 2023-09-27 DIAGNOSIS — M23.204 OLD TEAR OF MEDIAL MENISCUS OF LEFT KNEE, UNSPECIFIED TEAR TYPE: Primary | ICD-10-CM

## 2023-09-27 PROCEDURE — 99999 PR PBB SHADOW E&M-EST. PATIENT-LVL III: ICD-10-PCS | Mod: PBBFAC,,, | Performed by: PHYSICIAN ASSISTANT

## 2023-09-27 PROCEDURE — 99999 PR PBB SHADOW E&M-EST. PATIENT-LVL III: CPT | Mod: PBBFAC,,, | Performed by: PHYSICIAN ASSISTANT

## 2023-09-27 PROCEDURE — 99499 UNLISTED E&M SERVICE: CPT | Mod: S$GLB,,, | Performed by: PHYSICIAN ASSISTANT

## 2023-09-27 PROCEDURE — 99499 NO LOS: ICD-10-PCS | Mod: S$GLB,,, | Performed by: PHYSICIAN ASSISTANT

## 2023-09-27 RX ORDER — SODIUM CHLORIDE 9 MG/ML
INJECTION, SOLUTION INTRAVENOUS CONTINUOUS
Status: CANCELLED | OUTPATIENT
Start: 2023-09-27

## 2023-09-27 RX ORDER — CEFAZOLIN SODIUM 2 G/50ML
2 SOLUTION INTRAVENOUS
Status: CANCELLED | OUTPATIENT
Start: 2023-09-27

## 2023-09-27 RX ORDER — HYDROCODONE BITARTRATE AND ACETAMINOPHEN 7.5; 325 MG/1; MG/1
1 TABLET ORAL EVERY 6 HOURS PRN
Qty: 28 TABLET | Refills: 0 | Status: SHIPPED | OUTPATIENT
Start: 2023-09-27

## 2023-09-27 RX ORDER — ONDANSETRON 4 MG/1
4 TABLET, ORALLY DISINTEGRATING ORAL EVERY 8 HOURS PRN
Qty: 30 TABLET | Refills: 0 | Status: SHIPPED | OUTPATIENT
Start: 2023-09-27

## 2023-09-27 NOTE — H&P
Di Perkins  is here for a completion of her perioperative paperwork. she  Is scheduled to undergo Left knee arthroscopic partial medial meniscectomy and chondroplasty as indicated on 9/29/2023.  She is a healthy individual and does need clearance for this procedure.     Patient cleared for surgery by her PCP Dr. Pal.    Patient requesting Dr. Aguirre do her anesthesia.    Risks, indications and benefits of the surgical procedure were discussed with the patient. All questions with regard to surgery, rehab, expected return to functional activities, activities of daily living and recreational endeavors were answered to her satisfaction.    Discussed COVID-19 with the patient, they are aware of our current policies and procedures, were given the option of delaying surgery, and they elect to proceed.    Patient was informed and understands the risks of surgery are greater for patients with a current condition or hx of heart disease, obesity, clotting disorders, recurrent infections, steroid use, current or past smoking, and factors such as sedentary lifestyle and noncompliance with medications, therapy or f/u. The degree of the increased risk is hard to estimate w/ any degree of precision.    Once no other questions were asked, a brief history and physical exam was then performed.    PAST MEDICAL HISTORY:   Past Medical History:   Diagnosis Date    Deep vein thrombosis 2002    left forearm, due to Factor 5 Leiden    Factor 5 Leiden mutation, heterozygous      PAST SURGICAL HISTORY:   Past Surgical History:   Procedure Laterality Date    UTERINE FIBROID SURGERY       FAMILY HISTORY:   Family History   Problem Relation Age of Onset    Diabetes Mother      SOCIAL HISTORY:   Social History     Socioeconomic History    Marital status:    Tobacco Use    Smoking status: Never    Smokeless tobacco: Never   Substance and Sexual Activity    Alcohol use: No    Drug use: No    Sexual activity: Yes     Partners:  Male     Birth control/protection: None       MEDICATIONS: No current outpatient medications on file.  ALLERGIES: Review of patient's allergies indicates:  No Known Allergies    Review of Systems   Constitution: Negative. Negative for chills, fever and night sweats.   HENT: Negative for congestion and headaches.    Eyes: Negative for blurred vision, left vision loss and right vision loss.   Cardiovascular: Negative for chest pain and syncope.   Respiratory: Negative for cough and shortness of breath.    Endocrine: Negative for polydipsia, polyphagia and polyuria.   Hematologic/Lymphatic: Negative for bleeding problem. Does not bruise/bleed easily.   Skin: Negative for dry skin, itching and rash.   Musculoskeletal: Negative for falls and muscle weakness.   Gastrointestinal: Negative for abdominal pain and bowel incontinence.   Genitourinary: Negative for bladder incontinence and nocturia.   Neurological: Negative for disturbances in coordination, loss of balance and seizures.   Psychiatric/Behavioral: Negative for depression. The patient does not have insomnia.    Allergic/Immunologic: Negative for hives and persistent infections.     PHYSICAL EXAM:  GEN: A&Ox3, WD WN NAD  HEENT: WNL  CHEST: CTAB, no W/R/R  HEART: RRR, no M/R/G   ABD: Soft, NT ND, BS x4 QUADS  MS: Refer to previous note for detailed MS exam  NEURO: CN II-XII intact       The surgical consent was then reviewed with the patient, who agreed with all the contents of the consent form and it was signed.     PHYSICAL THERAPY:  She was also instructed regarding physical therapy and will begin on POD#1-3. She is doing physical therapy at [facility].    POST OP CARE: Instructions were reviewed including care of the wound and dressing after surgery and when she can shower.     PAIN MANAGEMENT: Di Perkins was instructed regarding the ice packs that will be in place after surgery and her postoperative pain medications.      MEDICATION:  Norco 5 mg 1-2 q 4 hours PRN for pain  Zofran 4 mg q 8 hours PRN for nausea and vomiting.  Eliquis 2.5mg BID x 2 weeks for DVT prophylaxis starting on the evening after surgery.      Post op meds to be delivered bedside prior to discharge. Deliver to family if patient is in surgery at 5pm.     Patient was instructed to purchase and take Colace to counter possible GI side effects of taking opiates.     DVT prophylaxis was discussed with the patient today including risk factors for developing DVTs and history of DVTs. The patient was asked if any specific recommendations were given from the doctor/s that did pre-operative surgical clearance.      If the patient was previously taking 81mg baby aspirin, they were told to not take additional baby aspirin, using the above stated aspirin and to restart the 81mg aspirin daily after completion of the aspirin dose.      Patient was also told to buy over the counter Prilosec medication and take it once daily for GI protection as long as they are taking NSAIDs or Aspirin.     The patient was told that narcotic pain medications may make them drowsy and instructions were given to not sign legal documents, drive or operate heavy machinery, cars, or equipment while under the influence of narcotic medications.     As there were no other questions to be asked, she was given my business card along with Dr. Sanderson's business card if she has any questions or concerns prior to surgery or in the postop period.

## 2023-09-28 ENCOUNTER — ANESTHESIA EVENT (OUTPATIENT)
Dept: SURGERY | Facility: HOSPITAL | Age: 53
End: 2023-09-28
Payer: COMMERCIAL

## 2023-09-29 ENCOUNTER — HOSPITAL ENCOUNTER (OUTPATIENT)
Facility: HOSPITAL | Age: 53
Discharge: HOME | End: 2023-09-29
Attending: ORTHOPAEDIC SURGERY | Admitting: ORTHOPAEDIC SURGERY
Payer: COMMERCIAL

## 2023-09-29 ENCOUNTER — ANESTHESIA (OUTPATIENT)
Dept: SURGERY | Facility: HOSPITAL | Age: 53
End: 2023-09-29
Payer: COMMERCIAL

## 2023-09-29 VITALS
RESPIRATION RATE: 15 BRPM | TEMPERATURE: 98 F | OXYGEN SATURATION: 96 % | WEIGHT: 174 LBS | DIASTOLIC BLOOD PRESSURE: 72 MMHG | HEIGHT: 64 IN | HEART RATE: 66 BPM | SYSTOLIC BLOOD PRESSURE: 151 MMHG | BODY MASS INDEX: 29.71 KG/M2

## 2023-09-29 DIAGNOSIS — M23.204 OLD TEAR OF MEDIAL MENISCUS OF LEFT KNEE, UNSPECIFIED TEAR TYPE: ICD-10-CM

## 2023-09-29 DIAGNOSIS — S83.242S TEAR OF MEDIAL MENISCUS OF LEFT KNEE, UNSPECIFIED TEAR TYPE, UNSPECIFIED WHETHER OLD OR CURRENT TEAR, SEQUELA: ICD-10-CM

## 2023-09-29 PROBLEM — S83.242A TEAR OF MEDIAL MENISCUS OF LEFT KNEE: Status: ACTIVE | Noted: 2023-09-29

## 2023-09-29 LAB
B-HCG UR QL: NEGATIVE
CTP QC/QA: YES

## 2023-09-29 PROCEDURE — 29881 PR KNEE SCOPE SINGLE MENISECECTOMY: ICD-10-PCS | Mod: LT,,, | Performed by: ORTHOPAEDIC SURGERY

## 2023-09-29 PROCEDURE — 25000003 PHARM REV CODE 250: Performed by: NURSE ANESTHETIST, CERTIFIED REGISTERED

## 2023-09-29 PROCEDURE — 37000008 HC ANESTHESIA 1ST 15 MINUTES: Performed by: ORTHOPAEDIC SURGERY

## 2023-09-29 PROCEDURE — 64447 NJX AA&/STRD FEMORAL NRV IMG: CPT | Performed by: ANESTHESIOLOGY

## 2023-09-29 PROCEDURE — D9220A PRA ANESTHESIA: Mod: ANES,,, | Performed by: ANESTHESIOLOGY

## 2023-09-29 PROCEDURE — D9220A PRA ANESTHESIA: ICD-10-PCS | Mod: ANES,,, | Performed by: ANESTHESIOLOGY

## 2023-09-29 PROCEDURE — 81025 URINE PREGNANCY TEST: CPT | Performed by: PHYSICIAN ASSISTANT

## 2023-09-29 PROCEDURE — 63600175 PHARM REV CODE 636 W HCPCS: Performed by: PHYSICIAN ASSISTANT

## 2023-09-29 PROCEDURE — 36000711: Performed by: ORTHOPAEDIC SURGERY

## 2023-09-29 PROCEDURE — 71000033 HC RECOVERY, INTIAL HOUR: Performed by: ORTHOPAEDIC SURGERY

## 2023-09-29 PROCEDURE — 29881 ARTHRS KNE SRG MNISECTMY M/L: CPT | Mod: LT,,, | Performed by: ORTHOPAEDIC SURGERY

## 2023-09-29 PROCEDURE — 63600175 PHARM REV CODE 636 W HCPCS: Performed by: ORTHOPAEDIC SURGERY

## 2023-09-29 PROCEDURE — 37000009 HC ANESTHESIA EA ADD 15 MINS: Performed by: ORTHOPAEDIC SURGERY

## 2023-09-29 PROCEDURE — 63600175 PHARM REV CODE 636 W HCPCS: Performed by: ANESTHESIOLOGY

## 2023-09-29 PROCEDURE — 81025 URINE PREGNANCY TEST: CPT | Mod: ,,, | Performed by: PHYSICIAN ASSISTANT

## 2023-09-29 PROCEDURE — 36000710: Performed by: ORTHOPAEDIC SURGERY

## 2023-09-29 PROCEDURE — 25000003 PHARM REV CODE 250: Performed by: PHYSICIAN ASSISTANT

## 2023-09-29 PROCEDURE — 81025 POCT URINE PREGNANCY: ICD-10-PCS | Mod: ,,, | Performed by: PHYSICIAN ASSISTANT

## 2023-09-29 PROCEDURE — 63600175 PHARM REV CODE 636 W HCPCS: Performed by: NURSE ANESTHETIST, CERTIFIED REGISTERED

## 2023-09-29 PROCEDURE — 94761 N-INVAS EAR/PLS OXIMETRY MLT: CPT

## 2023-09-29 PROCEDURE — D9220A PRA ANESTHESIA: Mod: CRNA,,, | Performed by: NURSE ANESTHETIST, CERTIFIED REGISTERED

## 2023-09-29 PROCEDURE — 25000003 PHARM REV CODE 250: Performed by: STUDENT IN AN ORGANIZED HEALTH CARE EDUCATION/TRAINING PROGRAM

## 2023-09-29 PROCEDURE — 64447 ADDUCTOR CANAL SINGLE SHOT: ICD-10-PCS | Mod: 59,LT,, | Performed by: ANESTHESIOLOGY

## 2023-09-29 PROCEDURE — 25000003 PHARM REV CODE 250: Performed by: ANESTHESIOLOGY

## 2023-09-29 PROCEDURE — 27201423 OPTIME MED/SURG SUP & DEVICES STERILE SUPPLY: Performed by: ORTHOPAEDIC SURGERY

## 2023-09-29 PROCEDURE — 99900035 HC TECH TIME PER 15 MIN (STAT)

## 2023-09-29 PROCEDURE — 71000015 HC POSTOP RECOV 1ST HR: Performed by: ORTHOPAEDIC SURGERY

## 2023-09-29 PROCEDURE — D9220A PRA ANESTHESIA: ICD-10-PCS | Mod: CRNA,,, | Performed by: NURSE ANESTHETIST, CERTIFIED REGISTERED

## 2023-09-29 RX ORDER — METHOCARBAMOL 500 MG/1
1000 TABLET, FILM COATED ORAL ONCE
Status: COMPLETED | OUTPATIENT
Start: 2023-09-29 | End: 2023-09-29

## 2023-09-29 RX ORDER — SODIUM CHLORIDE 0.9 % (FLUSH) 0.9 %
3 SYRINGE (ML) INJECTION
Status: DISCONTINUED | OUTPATIENT
Start: 2023-09-29 | End: 2023-09-29 | Stop reason: HOSPADM

## 2023-09-29 RX ORDER — CELECOXIB 200 MG/1
200 CAPSULE ORAL
Status: COMPLETED | OUTPATIENT
Start: 2023-09-29 | End: 2023-09-29

## 2023-09-29 RX ORDER — FENTANYL CITRATE 50 UG/ML
INJECTION, SOLUTION INTRAMUSCULAR; INTRAVENOUS
Status: DISCONTINUED | OUTPATIENT
Start: 2023-09-29 | End: 2023-09-29

## 2023-09-29 RX ORDER — KETAMINE HCL IN 0.9 % NACL 50 MG/5 ML
SYRINGE (ML) INTRAVENOUS
Status: DISCONTINUED | OUTPATIENT
Start: 2023-09-29 | End: 2023-09-29

## 2023-09-29 RX ORDER — CARBOXYMETHYLCELLULOSE SODIUM 10 MG/ML
GEL OPHTHALMIC
Status: DISCONTINUED | OUTPATIENT
Start: 2023-09-29 | End: 2023-09-29

## 2023-09-29 RX ORDER — DEXAMETHASONE SODIUM PHOSPHATE 4 MG/ML
INJECTION, SOLUTION INTRA-ARTICULAR; INTRALESIONAL; INTRAMUSCULAR; INTRAVENOUS; SOFT TISSUE
Status: DISCONTINUED | OUTPATIENT
Start: 2023-09-29 | End: 2023-09-29

## 2023-09-29 RX ORDER — MIDAZOLAM HYDROCHLORIDE 1 MG/ML
INJECTION, SOLUTION INTRAMUSCULAR; INTRAVENOUS
Status: DISCONTINUED | OUTPATIENT
Start: 2023-09-29 | End: 2023-09-29

## 2023-09-29 RX ORDER — MIDAZOLAM HYDROCHLORIDE 1 MG/ML
.5-4 INJECTION INTRAMUSCULAR; INTRAVENOUS
Status: DISCONTINUED | OUTPATIENT
Start: 2023-09-29 | End: 2023-09-29 | Stop reason: HOSPADM

## 2023-09-29 RX ORDER — LIDOCAINE HYDROCHLORIDE 20 MG/ML
INJECTION INTRAVENOUS
Status: DISCONTINUED | OUTPATIENT
Start: 2023-09-29 | End: 2023-09-29

## 2023-09-29 RX ORDER — FENTANYL CITRATE 50 UG/ML
25 INJECTION, SOLUTION INTRAMUSCULAR; INTRAVENOUS EVERY 5 MIN PRN
Status: DISCONTINUED | OUTPATIENT
Start: 2023-09-29 | End: 2023-09-29 | Stop reason: HOSPADM

## 2023-09-29 RX ORDER — ONDANSETRON 2 MG/ML
INJECTION INTRAMUSCULAR; INTRAVENOUS
Status: DISCONTINUED | OUTPATIENT
Start: 2023-09-29 | End: 2023-09-29

## 2023-09-29 RX ORDER — FENTANYL CITRATE 50 UG/ML
25-200 INJECTION, SOLUTION INTRAMUSCULAR; INTRAVENOUS
Status: DISCONTINUED | OUTPATIENT
Start: 2023-09-29 | End: 2023-09-29 | Stop reason: HOSPADM

## 2023-09-29 RX ORDER — TRANEXAMIC ACID 100 MG/ML
INJECTION, SOLUTION INTRAVENOUS
Status: DISCONTINUED | OUTPATIENT
Start: 2023-09-29 | End: 2023-09-29

## 2023-09-29 RX ORDER — ACETAMINOPHEN 500 MG
1000 TABLET ORAL
Status: COMPLETED | OUTPATIENT
Start: 2023-09-29 | End: 2023-09-29

## 2023-09-29 RX ORDER — BUPIVACAINE HYDROCHLORIDE 2.5 MG/ML
INJECTION, SOLUTION EPIDURAL; INFILTRATION; INTRACAUDAL
Status: DISCONTINUED | OUTPATIENT
Start: 2023-09-29 | End: 2023-09-29

## 2023-09-29 RX ORDER — SODIUM CHLORIDE 9 MG/ML
INJECTION, SOLUTION INTRAVENOUS CONTINUOUS
Status: DISCONTINUED | OUTPATIENT
Start: 2023-09-29 | End: 2023-09-29 | Stop reason: HOSPADM

## 2023-09-29 RX ORDER — HALOPERIDOL 5 MG/ML
0.5 INJECTION INTRAMUSCULAR EVERY 10 MIN PRN
Status: DISCONTINUED | OUTPATIENT
Start: 2023-09-29 | End: 2023-09-29 | Stop reason: HOSPADM

## 2023-09-29 RX ORDER — PROPOFOL 10 MG/ML
VIAL (ML) INTRAVENOUS
Status: DISCONTINUED | OUTPATIENT
Start: 2023-09-29 | End: 2023-09-29

## 2023-09-29 RX ORDER — ESMOLOL HYDROCHLORIDE 10 MG/ML
INJECTION INTRAVENOUS
Status: DISCONTINUED | OUTPATIENT
Start: 2023-09-29 | End: 2023-09-29

## 2023-09-29 RX ORDER — EPINEPHRINE 1 MG/ML
INJECTION, SOLUTION, CONCENTRATE INTRAVENOUS
Status: DISCONTINUED | OUTPATIENT
Start: 2023-09-29 | End: 2023-09-29 | Stop reason: HOSPADM

## 2023-09-29 RX ORDER — FAMOTIDINE 10 MG/ML
INJECTION INTRAVENOUS
Status: DISCONTINUED | OUTPATIENT
Start: 2023-09-29 | End: 2023-09-29

## 2023-09-29 RX ORDER — OXYCODONE HYDROCHLORIDE 5 MG/1
5 TABLET ORAL
Status: DISCONTINUED | OUTPATIENT
Start: 2023-09-29 | End: 2023-09-29 | Stop reason: HOSPADM

## 2023-09-29 RX ORDER — ROPIVACAINE HYDROCHLORIDE 2 MG/ML
INJECTION, SOLUTION EPIDURAL; INFILTRATION; PERINEURAL
Status: COMPLETED | OUTPATIENT
Start: 2023-09-29 | End: 2023-09-29

## 2023-09-29 RX ADMIN — PROPOFOL 50 MG: 10 INJECTION, EMULSION INTRAVENOUS at 08:09

## 2023-09-29 RX ADMIN — SODIUM CHLORIDE: 0.9 INJECTION, SOLUTION INTRAVENOUS at 06:09

## 2023-09-29 RX ADMIN — ONDANSETRON 4 MG: 2 INJECTION INTRAMUSCULAR; INTRAVENOUS at 08:09

## 2023-09-29 RX ADMIN — ESMOLOL HYDROCHLORIDE 30 MG: 100 INJECTION, SOLUTION INTRAVENOUS at 08:09

## 2023-09-29 RX ADMIN — BUPIVACAINE HYDROCHLORIDE 20 ML: 2.5 INJECTION, SOLUTION EPIDURAL; INFILTRATION; INTRACAUDAL; PERINEURAL at 09:09

## 2023-09-29 RX ADMIN — TRANEXAMIC ACID 1000 MG: 100 INJECTION INTRAVENOUS at 07:09

## 2023-09-29 RX ADMIN — PROPOFOL 150 MG: 10 INJECTION, EMULSION INTRAVENOUS at 07:09

## 2023-09-29 RX ADMIN — PROPOFOL 50 MG: 10 INJECTION, EMULSION INTRAVENOUS at 07:09

## 2023-09-29 RX ADMIN — MIDAZOLAM HYDROCHLORIDE 2 MG: 1 INJECTION, SOLUTION INTRAMUSCULAR; INTRAVENOUS at 06:09

## 2023-09-29 RX ADMIN — FENTANYL CITRATE 25 MCG: 50 INJECTION INTRAMUSCULAR; INTRAVENOUS at 09:09

## 2023-09-29 RX ADMIN — DEXAMETHASONE SODIUM PHOSPHATE 8 MG: 4 INJECTION, SOLUTION INTRAMUSCULAR; INTRAVENOUS at 07:09

## 2023-09-29 RX ADMIN — CEFAZOLIN 2 G: 2 INJECTION, POWDER, FOR SOLUTION INTRAMUSCULAR; INTRAVENOUS at 07:09

## 2023-09-29 RX ADMIN — LIDOCAINE HYDROCHLORIDE 100 MG: 20 INJECTION INTRAVENOUS at 07:09

## 2023-09-29 RX ADMIN — FAMOTIDINE 20 MG: 10 INJECTION, SOLUTION INTRAVENOUS at 07:09

## 2023-09-29 RX ADMIN — METHOCARBAMOL 1000 MG: 500 TABLET ORAL at 09:09

## 2023-09-29 RX ADMIN — CARBOXYMETHYLCELLULOSE SODIUM 4 DROP: 10 GEL OPHTHALMIC at 07:09

## 2023-09-29 RX ADMIN — CELECOXIB 200 MG: 200 CAPSULE ORAL at 06:09

## 2023-09-29 RX ADMIN — Medication 20 MG: at 07:09

## 2023-09-29 RX ADMIN — ACETAMINOPHEN 1000 MG: 500 TABLET ORAL at 06:09

## 2023-09-29 RX ADMIN — FENTANYL CITRATE 100 MCG: 50 INJECTION, SOLUTION INTRAMUSCULAR; INTRAVENOUS at 07:09

## 2023-09-29 RX ADMIN — OXYCODONE HYDROCHLORIDE 5 MG: 5 TABLET ORAL at 09:09

## 2023-09-29 RX ADMIN — SODIUM CHLORIDE: 0.9 INJECTION, SOLUTION INTRAVENOUS at 07:09

## 2023-09-29 NOTE — ANESTHESIA PREPROCEDURE EVALUATION
"                                                                                                             09/29/2023  Di Perkins is a 52 y.o., female.    Pre-operative evaluation for Procedure(s) (LRB):  ARTHROSCOPY, KNEE, WITH MENISCECTOMY (Left)    Di Perkins is a 52 y.o. female     Patient Active Problem List   Diagnosis    Breast lump in female    Uterine fibroid    Factor 5 Leiden mutation, heterozygous    History of DVT in adulthood    Old tear of medial meniscus of left knee    Swelling of joint of left knee    Quadriceps weakness    Impaired mobility and ADLs       Review of patient's allergies indicates:  No Known Allergies    No current facility-administered medications on file prior to encounter.     No current outpatient medications on file prior to encounter.       Past Surgical History:   Procedure Laterality Date    UTERINE FIBROID SURGERY           CBC:  Lab Results   Component Value Date    WBC 5.76 08/08/2023    RBC 4.48 08/08/2023    HGB 12.2 08/08/2023    HCT 39.7 08/08/2023     08/08/2023    MCV 89 08/08/2023    MCH 27.2 08/08/2023    MCHC 30.7 (L) 08/08/2023       CMP:   Lab Results   Component Value Date     08/08/2023    K 4.3 08/08/2023     08/08/2023    CO2 28 08/08/2023    BUN 12 08/08/2023    CREATININE 0.8 08/08/2023    GLU 83 08/08/2023    CALCIUM 9.3 08/08/2023    ALBUMIN 4.2 08/08/2023    PROT 7.5 08/08/2023    ALKPHOS 63 08/08/2023    ALT 17 08/08/2023    AST 22 08/08/2023    BILITOT 0.4 08/08/2023       INR:  No results found for: "PT", "INR", "PROTIME", "APTT"      Diagnostic Studies:    EKG 2016  NSR     2D Echo:  No results found for this or any previous visit.    Stress Test:   No results found for this or any previous visit.      Pre-op Vitals [09/29/23 0628]   BP Pulse Resp Temp SpO2   (!) 152/87 (!) 58 16 36.6 °C (97.8 °F) 100 %      Height Weight BMI (Calculated)     5' 4" 174 lb 29.9         Pre-op Vitals [09/29/23 0628]   BP " "Pulse Resp Temp SpO2   (!) 152/87 (!) 58 16 36.6 °C (97.8 °F) 100 %      Height Weight BMI (Calculated)     5' 4" 174 lb 29.9            Pre-op Assessment    I have reviewed the Patient Summary Reports.       I have reviewed the Medications.     Review of Systems  Anesthesia Hx:  No problems with previous Anesthesia   Denies Personal Hx of Anesthesia complications.   Social:  Non-Smoker, No Alcohol Use    Hematology/Oncology:     Oncology Normal   Hematology Comments: H/O Factor 5 Leiden mutation, heterozygous, DVT left forearm,  Surgeon will use Eliquis for post-op DVT prophylaxis   EENT/Dental:EENT/Dental Normal   Cardiovascular:  Cardiovascular Normal Exercise tolerance: good  Denies CAD.     Denies Angina.  Denies KNOTT.    Pulmonary:  Pulmonary Normal  Denies Asthma.  Denies Shortness of breath.    Renal/:  Renal/ Normal  Denies Chronic Renal Disease.     Hepatic/GI:  Hepatic/GI Normal  Denies GERD. Denies Liver Disease.    Musculoskeletal:   Old tear of medial meniscus of left knee,  Swelling of joint of left knee,  Quadriceps weakness   OB/GYN/PEDS:  H/O uterine fibroids, S/P myomectomy in 2000   Neurological:  Neurology Normal  Denies CVA. Denies Seizures.    Endocrine:  Endocrine Normal Denies Diabetes. Denies Hypothyroidism.    Dermatological:  Skin Normal    Psych:  Psychiatric Normal         Physical Exam  General: Well nourished and Cooperative    Airway:  Mallampati: II   Mouth Opening: < 3 cm  TM Distance: Normal  Neck ROM: Normal ROM    Dental:  Intact    Chest/Lungs:  Normal Respiratory Rate    Heart:  Rate: Normal  Rhythm: Regular Rhythm      Past Medical History:   Diagnosis Date    Deep vein thrombosis 2002    left forearm, due to Factor 5 Leiden    Factor 5 Leiden mutation, heterozygous      Past Surgical History:   Procedure Laterality Date    UTERINE FIBROID SURGERY         Anesthesia Assessment: Preoperative EQUATION    Planned Procedure: Procedure(s) (LRB):  ARTHROSCOPY, KNEE, WITH " MENISCECTOMY (Left)  Requested Anesthesia Type:General  Surgeon: MAYELA Sanderson MD  Service: Orthopedics  Known or anticipated Date of Surgery:9/29/2023    Surgeon notes: reviewed    Electronic QUestionnaire Assessment completed via nurse interview with patient.        Triage considerations:     The patient has no apparent active cardiac condition (No unstable coronary Syndrome such as severe unstable angina or recent [<1 month] myocardial infarction, decompensated CHF, severe valvular   disease or significant arrhythmia)    Previous anesthesia records:No problems and Not available    [facility]       Other important co-morbidities:  No co-morbidities noted        EKG 10/20/2016:  Vent. Rate : 070 BPM     Atrial Rate : 070 BPM      P-R Int : 180 ms          QRS Dur : 072 ms       QT Int : 390 ms       P-R-T Axes : 028 018 001 degrees      QTc Int : 421 ms   Normal sinus rhythm   Normal ECG   No previous ECGs available   Confirmed by Richard Kong MD ([id number]) on 10/24/2016 10:37:47 AM                Instructions given. (See in Nurse's note)      Ht: 5'4  Wt: 171 lb  BMI: 29.52  Vaccinated      Anesthesia Plan  Type of Anesthesia, risks & benefits discussed:    Anesthesia Type: Regional, Gen Supraglottic Airway  Intra-op Monitoring Plan: Standard ASA Monitors  Post Op Pain Control Plan: multimodal analgesia, peripheral nerve block and IV/PO Opioids PRN  Induction:  IV  Informed Consent: Informed consent signed with the Patient and all parties understand the risks and agree with anesthesia plan.  All questions answered.   ASA Score: 1  Anesthesia Plan Notes: Plan for post op block if needed    Ready For Surgery From Anesthesia Perspective.       .

## 2023-09-29 NOTE — ANESTHESIA POSTPROCEDURE EVALUATION
Anesthesia Post Evaluation    Patient: Di Perkins    Procedure(s) Performed: Procedure(s) (LRB):  ARTHROSCOPY, KNEE, WITH PARTIAL MEDIAL MENISCECTOMY (Left)  ARTHROSCOPY, KNEE, WITH CHONDROPLASTY (Left)    Final Anesthesia Type: general      Patient location during evaluation: PACU  Patient participation: Yes- Able to Participate  Level of consciousness: awake and alert  Post-procedure vital signs: reviewed and stable  Pain management: adequate  Airway patency: patent  COSME mitigation strategies: Multimodal analgesia  PONV status at discharge: No PONV  Anesthetic complications: no      Cardiovascular status: blood pressure returned to baseline  Respiratory status: unassisted  Hydration status: euvolemic  Follow-up not needed.          Vitals Value Taken Time   /72 09/29/23 1016   Temp 36.4 °C (97.6 °F) 09/29/23 0945   Pulse 76 09/29/23 1023   Resp 19 09/29/23 1021   SpO2 97 % 09/29/23 1023   Vitals shown include unvalidated device data.      Event Time   Out of Recovery 09:45:00         Pain/Joel Score: Pain Rating Prior to Med Admin: 6 (9/29/2023  9:15 AM)  Joel Score: 10 (9/29/2023 10:05 AM)

## 2023-09-29 NOTE — BRIEF OP NOTE
"[facility] - Surgery ([facility])  Brief Operative Note    Surgery Date: 9/29/2023     Surgeon(s) and Role:     * MAYELA Sanderson MD - Primary    Assisting Surgeon: None    Pre-op Diagnosis:  Old tear of medial meniscus of left knee, unspecified tear type [M23.204]    Post-op Diagnosis:  Post-Op Diagnosis Codes:     * Old tear of medial meniscus of left knee, unspecified tear type [M23.204]    Procedure(s) (LRB):  ARTHROSCOPY, KNEE, WITH PARTIAL MEDIAL MENISCECTOMY (Left)  ARTHROSCOPY, KNEE, WITH CHONDROPLASTY (Left)    Anesthesia: General    Operative Findings: medial meniscus tear of left knee    Estimated Blood Loss: Minimal         Specimens:   Specimen (24h ago, onward)      None              Discharge Note    OUTCOME: Patient tolerated treatment/procedure well without complication and is now ready for discharge.    DISPOSITION: Home or Self Care    FINAL DIAGNOSIS:  Tear of medial meniscus of left knee    FOLLOWUP: In clinic    DISCHARGE INSTRUCTIONS:    Discharge Procedure Orders   CRUTCHES FOR HOME USE     Order Specific Question Answer Comments   Type: Axillary    Height: 5' 4" (1.626 m)    Weight: 79 kg (174 lb 2.6 oz)    Length of need (1-99 months): 99      Diet Adult Regular     Ice to affected area     Keep surgical extremity elevated     Notify your health care provider if you experience any of the following:  temperature >100.4     Notify your health care provider if you experience any of the following:  persistent nausea and vomiting or diarrhea     Notify your health care provider if you experience any of the following:  severe uncontrolled pain     Notify your health care provider if you experience any of the following:  redness, tenderness, or signs of infection (pain, swelling, redness, odor or green/yellow discharge around incision site)      Remove dressing in 72 hours   Order Comments: Remove soft dressings in 2-3 days and place waterproof bandages over incisions.     Activity as tolerated "

## 2023-09-29 NOTE — PLAN OF CARE
Patient prepped for procedure.  POC reviewed with pt and her , who verbalized understanding.    Outpatient pharmacy confirmed.  Patient will need crutches at discharge.      to keep all belongings during procedure.

## 2023-09-29 NOTE — ANESTHESIA PROCEDURE NOTES
Intubation    Date/Time: 9/29/2023 7:06 AM    Performed by: Esteban Thompson CRNA  Authorized by: Judith Howard MD    Intubation:     Induction:  Intravenous    Intubated:  Postinduction    Mask Ventilation:  Easy mask    Attempts:  1    Attempted By:  CRNA    Difficult Airway Encountered?: No      Complications:  None    Airway Device:  Supraglottic airway/LMA    Airway Device Size:  4.0    Style/Cuff Inflation:  Cuffed    Secured at:  The lips    Placement Verified By:  Capnometry    Complicating Factors:  None    Findings Post-Intubation:  BS equal bilateral and atraumatic/condition of teeth unchanged

## 2023-09-29 NOTE — TRANSFER OF CARE
"Anesthesia Transfer of Care Note    Patient: Di Perkins    Procedure(s) Performed: Procedure(s) (LRB):  ARTHROSCOPY, KNEE, WITH PARTIAL MEDIAL MENISCECTOMY (Left)  ARTHROSCOPY, KNEE, WITH CHONDROPLASTY (Left)    Patient location: PACU    Anesthesia Type: general    Transport from OR: Transported from OR on 6-10 L/min O2 by face mask with adequate spontaneous ventilation    Post pain: adequate analgesia    Post assessment: no apparent anesthetic complications and tolerated procedure well    Post vital signs: stable    Level of consciousness: awake, alert and oriented    Nausea/Vomiting: no nausea/vomiting    Complications: none    Transfer of care protocol was followed      Last vitals:   Visit Vitals  BP (!) 152/87 (BP Location: Right arm, Patient Position: Lying)   Pulse 62   Temp 36.6 °C (97.8 °F) (Oral)   Resp 16   Ht 5' 4" (1.626 m)   Wt 78.9 kg (174 lb)   LMP 09/29/2022   SpO2 100%   Breastfeeding No   BMI 29.87 kg/m²     "

## 2023-09-29 NOTE — PLAN OF CARE
VSS.  Patient tolerating oral liquids without difficulty.   No apparent s&s of distress noted at this time, no complaints voiced at this time.   Discharge instructions reviewed with patient and spouse with good verbal feedback received.   Post op medications reviewed, crutches given to patient's .   Patient ready for discharge. Patient transported to car via wheelchair.

## 2023-09-29 NOTE — ANESTHESIA PROCEDURE NOTES
Adductor Canal Single shot    Patient location during procedure: post-op   Block not for primary anesthetic.  Reason for block: at surgeon's request and post-op pain management   Post-op Pain Location: left knee pain   Start time: 9/29/2023 9:45 AM  Timeout: 9/29/2023 9:44 AM   End time: 9/29/2023 9:48 AM    Staffing  Authorizing Provider: Judith Howard MD  Performing Provider: Judith Howard MD    Staffing  Performed by: Judith Howard MD  Authorized by: Judith Howard MD    Preanesthetic Checklist  Completed: patient identified, IV checked, site marked, risks and benefits discussed, surgical consent, monitors and equipment checked, pre-op evaluation and timeout performed  Peripheral Block  Patient position: supine  Prep: ChloraPrep  Patient monitoring: heart rate, cardiac monitor, continuous pulse ox, continuous capnometry and frequent blood pressure checks  Block type: adductor canal  Laterality: left  Injection technique: single shot  Needle  Needle type: Stimuplex   Needle gauge: 21 G  Needle length: 4 in  Needle localization: anatomical landmarks and ultrasound guidance   -ultrasound image captured on disc.  Assessment  Injection assessment: negative aspiration, negative parasthesia and local visualized surrounding nerve  Paresthesia pain: none  Heart rate change: no  Slow fractionated injection: yes    Medications:    Medications: bupivacaine (pf) (MARCAINE) injection 0.25% - Perineural   20 mL - 9/29/2023 9:46:00 AM    Additional Notes  VSS.  [facility] RN monitoring vitals throughout procedure.  Patient tolerated procedure well.

## 2023-10-02 ENCOUNTER — CLINICAL SUPPORT (OUTPATIENT)
Dept: REHABILITATION | Facility: HOSPITAL | Age: 53
End: 2023-10-02
Attending: PHYSICIAN ASSISTANT
Payer: COMMERCIAL

## 2023-10-02 DIAGNOSIS — M25.60 DECREASED RANGE OF MOTION: ICD-10-CM

## 2023-10-02 DIAGNOSIS — R26.9 ABNORMAL GAIT: ICD-10-CM

## 2023-10-02 DIAGNOSIS — R53.1 DECREASED STRENGTH: ICD-10-CM

## 2023-10-02 DIAGNOSIS — M23.204 OLD TEAR OF MEDIAL MENISCUS OF LEFT KNEE, UNSPECIFIED TEAR TYPE: ICD-10-CM

## 2023-10-02 PROCEDURE — 97112 NEUROMUSCULAR REEDUCATION: CPT | Mod: PN

## 2023-10-02 PROCEDURE — 97161 PT EVAL LOW COMPLEX 20 MIN: CPT | Mod: PN

## 2023-10-02 PROCEDURE — 97140 MANUAL THERAPY 1/> REGIONS: CPT | Mod: PN

## 2023-10-02 NOTE — OP NOTE
[facility]   OPERATIVE REPORT   ORTHOPAEDIC SURGERY   PROVIDER: DR. ESTRADA DO    PATIENT INFORMATION   Di Perkins 52 y.o. female 1970   MRN: [id number]   LOCATION: [facility]     DATE OF PROCEDURE: 9/29/2023     PREOPERATIVE DIAGNOSES:   Left  1. knee medial meniscus tear      POSTOPERATIVE DIAGNOSES:   Left  1. knee medial meniscus tear   2. knee chondromalacia  3. knee synovitis     PROCEDURE PERFORMED:   Left  1. knee arthroscopic partial medial meniscectomy (CPT 06828)   2. knee arthroscopic chondroplasty   3. knee arthroscopic partial synovectomy    SURGEON:  MAYELA Do MD - Primary     ASSISTANTS:  Julius Garcia MD - Fellow  CYNTHIA Freeman    ANESTHESIA: General with local anesthetic injection (0.2% Naropin)    ESTIMATED BLOOD LOSS:  Minimal    IMPLANTS: * No implants in log *     SPECIMENS: None.    COMPLICATIONS: None.     INTRAOPERATIVE COUNTS: Correct.     PROPHYLACTIC IV ANTIBIOTICS: Given per [facility] Protocol.    INDICATIONS FOR PROCEDURE: Di is a very nice 52-year-old lady with medially based left knee pain.  Exam and imaging has shown a symptomatic medial meniscus tear. The patient has been indicated for arthroscopic intervention.  Full informed consent was obtained prior to proceeding.    PROCEDURE IN DETAIL:  The patient was identified in preop holding. Permit was obtained for the above procedure. IV antibiotic was initiated for prophylaxis and the patient was brought to the operating room.       After Anesthesia was administered, a Time Out was verbalized with all OR staff agreeing to the patient and procedure.      The left knee and leg were prepped and draped in the usual sterile fashion.      Diagnostic arthroscopy was undertaken after establishing routine anteromedial and anterolateral scope portals.      Significant Findings:  1. Patellofemoral compartment -   Full-thickness area of grade 3-4 chondromalacia of the central trochlea, 5 x 8 mm  with a separate linear area of grade 4 chondromalacia of the lateral trochlea, grade 2 changes of the central eminence patella. No lateral tilt or subluxation of significance.  2. Notch - Normal ACL and PCL  3. Medial Compartment - Meniscus,   Complex tear of the inner 3rd posterior horn extending into the posterior body, horizontal oblique morphology with partial extension into the anterior posterior root with surrounding synovitis. Cartilage,  grade 2 chondromalacia of the medial femoral condyle and medial tibial plateau, diffuse.  No focal chondral defects.    4. Lateral compartment - Meniscus, intact.  Cartilage,   Fairly diffuse grade 2 chondromalacia of the lateral tibial plateau.    5. Loose bodies - None.  6. Other - Mild synovitis over the anterior compartment and suprapatellar recess with adhesions    Detailed description:     1. Meniscectomy: Partial medial meniscectomy was carried out from the posterior root, horn to posterior body with a combination of biters and belkis.  Trimming was completed to a stable, contoured edge. Approximately 10-15% of the posterior medial meniscus required resection.       2. Chondroplasty:  The arthroscopic shaver was utilized to perform chondroplasty of the medial femoral condyle, medial tibial plateau, lateral tibial plateau, trochlea and central eminence of the patella.  All unstable flaps of torn articular cartilage were gently debrided back to a stable margin.  3. Partial synovectomy:   The arthroscopic shaver was utilized to perform partial synovectomy over the anterior interval extending into the suprapatellar recess.  Care was taken to maintain hemostasis throughout.    Photos were taken. The portals were closed in standard fashion using 3-0 Nylon suture.    The dressing was placed after local was administered subcutaneously and the patient was awakened and transferred to the recovery room in satisfactory condition.  There were no apparent complications.      POSTOPERATIVE PLAN: Patient may weight bear as tolerates on crutches. Full range of motion to tolerance. Will start physical therapy right away. Eliquis 2.5 mg BID x 2 weeks for DVT prophylaxis given the patient's factor 5 Leiden history.

## 2023-10-02 NOTE — PLAN OF CARE
[facility]   Physical Therapy Initial Evaluation      Name: Di Perkins  Clinic Number: [id number]    Therapy Diagnosis:   Encounter Diagnoses   Name Primary?    Old tear of medial meniscus of left knee, unspecified tear type     Abnormal gait     Decreased strength     Decreased range of motion         Physician: Myrtle Singh*    Physician Orders: PT Eval and Treat   Medical Diagnosis from Referral: M23.204 (ICD-10-CM) - Old tear of medial meniscus of left knee, unspecified tear type  Evaluation Date: 10/2/2023  Authorization Period Expiration: 12/29/2023  Plan of Care Expiration: 12/1/2023  Progress Note Due: 11/2/2023  Visit # / Visits authorized: 1/ 1   FOTO: 1/5    Time In: 11:06am  Time Out: 12:00  Total Appointment Time (timed & untimed codes): 54 minutes    Precautions: Standard .  DOS 9/29/2023  Procedure(s) (LRB):  ARTHROSCOPY, KNEE, WITH PARTIAL MEDIAL MENISCECTOMY (Left)  ARTHROSCOPY, KNEE, WITH CHONDROPLASTY (Left)  Subjective     Date of onset: 9/29/2023    History of current condition - Di reports: Pt reports having left knee surgery last week. She stayed in bed most of the weekend. Pain has been alright, pretty manageable. She was told weight bearing as tolerated. She had trouble with her knee for a few years prior to surgery. She has her follow up scheduled for 2 week post op. She does have a hx of blood clots but is already on Eliquis for this. She is aware of what to look for.      Medical History:   Past Medical History:   Diagnosis Date    Deep vein thrombosis 2002    left forearm, due to Factor 5 Leiden    Factor 5 Leiden mutation, heterozygous        Surgical History:   Di Perkins  has a past surgical history that includes Uterine fibroid surgery; Knee arthroscopy w/ meniscectomy (Left, 9/29/2023); and Arthroscopic chondroplasty of knee joint (Left, 9/29/2023).    Medications:   Di has a current medication list which includes the  following prescription(s): apixaban, hydrocodone-acetaminophen, and ondansetron.    Allergies:   Review of patient's allergies indicates:  No Known Allergies     Imaging, MRI studies: Impression:     Tear posterior horn medial meniscus, predominantly horizontal with truncation of the posterior horn and mid body free edge  Moderate effusion with suprapatellar plic  Electronically signed by: David Morris MD    Prior Therapy: She had PT prior to surgery but it has been a few years. No injections.   Social History:  lives with their family  Occupation:   Prior Level of Function: independent  Current Level of Function: Ambulating with a single axillary crutch.     Pain:  Current 0/10, worst 6/10, best 0/10   Location: left knee   Description: Aching and occasionally sharp   Aggravating Factors: Standing, Bending, and Walking  Easing Factors: pain medication and ice    Pts goals: Pt would like to just be able to walk around normally.     Objective   Observation: pt appears well nourished, in no signs of distress.   Posture: Rests in slight knee flexion  Gait: Ambulates c 1 axillary crutch on right. Decreased stance time on left lower extremity, slight knee flexion during stance phase.   Palpation: TTP along patella  Sensation: intact  DTRs: NT  Myotomes: NT  Dermatomes: NT    Range of Motion/Strength:       Knee Right Left Pain/Dysfunction with Movement   AROM/PROM      flexion  132/135  45/100    extension  3/3  -8/-5        L/E MMT Right Left Pain/Dysfunction with Movement   Hip Flexion 4+/5 4/5    Hip Extension NT NT    Hip Abduction/PGM 4+/5 4-/5    Hip Adduction NT NT    Hip IR NT nT    Hip ER NT NT    Knee Flexion 5/5 4-/5    Knee Extension 5/5 4-/5      Joint Mobility:   - Patella: Good mobility but painful  - Tibiofemoral: not tested    CMS Impairment/Limitation/Restriction for FOTO Survey    Therapist reviewed FOTO scores for Di Perkins on 10/2/2023.   FOTO documents entered into EPIC  "- see Media section.    Intake Score: 53%  Predicted Score:70%  KOOS 54.8%       TREATMENT     Treatment Time In: 11:30  Treatment Time Out: 12:00  Total Treatment time separate from Evaluation: 30 minutes    Di received the following manual therapy techniques: Joint mobilizations were applied to the: left knee for 10 minutes, including:  Infrapatellar fat pad mobilization  Patellar mobilization    Di participated in neuromuscular re-education activities to improve: Coordination, Sense, Proprioception, and Posture for 20 minutes. The following activities were included:  Quad set x20 5"  Gastroc stretch c quad stretch 15x5"  Straight leg raise x10  Heel slide 5min  Pt education      Home Exercises and Patient Education Provided    Education provided:   - Pt educated on POC  - Pt educated on HEP  - Pt educated on anatomy and physiology of current condition as it relates to signs and symptoms    Written Home Exercises Provided: yes.  Exercises were reviewed and Di was able to demonstrate them prior to the end of the session.  Di demonstrated good  understanding of the education provided.     See EMR under Patient Instructions for exercises provided 10/2/2023.    Assessment     Di is a 52 y.o. female referred to outpatient Physical Therapy with a medical diagnosis of s/p left menisectomy and chondroplasty. Patient presents with signs and symptoms consistent c referring diagnosis. She presents slightly lacking terminal knee extension. Flexion actively is poor, but passively she is able to get to 90'. Strength is within normal ranges for this stage of recovery. PT to utilize Microfet at next visit once pain is lower to get better strength measurements. Pt is currently ambulating with a single axillary crutch. Pt would benefit from skilled PT to address dysfunction listed above.     Patient prognosis is Good.   Patient will benefit from skilled outpatient Physical Therapy to address the deficits " stated above and in the chart below, provide patient /family education, and to maximize patientt's level of independence.     Plan of care discussed with patient: Yes  Patient's spiritual, cultural and educational needs considered and patient is agreeable to the plan of care and goals as stated below:     Anticipated Barriers for therapy: work schedul  Medical Necessity is demonstrated by the following  History  Co-morbidities and personal factors that may impact the plan of care [] LOW: no personal factors / co-morbidities  [x] MODERATE: 1-2 personal factors / co-morbidities  [] HIGH: 3+ personal factors / co-morbidities    Moderate / High Support Documentation:   Co-morbidities affecting plan of care: hx of DVT    Personal Factors:   no deficits     Examination  Body Structures and Functions, activity limitations and participation restrictions that may impact the plan of care [x] LOW: addressing 1-2 elements  [] MODERATE: 3+ elements  [] HIGH: 4+ elements (please support below)    Moderate / High Support Documentation: gait deficits, decreased strength     Clinical Presentation [x] LOW: stable  [] MODERATE: Evolving  [] HIGH: Unstable     Decision Making/ Complexity Score: low           Goals:  Short Term Goals (4 Weeks):  1. Pt will be compliant with initial HEP to supplement PT in restoring pain free function.  2. Pt knee extension range of motion to equal contralateral lower extremity in order for proper ambulation  3. PT will improve Active knee flexion to >90 in order to improve functional mobility  4. Pt will improve hip abduction strength to 4+/5 in order to improve gait.     Long Term Goals (8 Weeks):  1. Pt will improve FOTO score to </= 70% limited to decrease perceived limitation with mobility.   2. PT will improve Active knee flexion to >120 in order to improve functional mobility  3. Pt will report pain no greater than a 2/10 in order to return to work fully  4. Pt will be independent c final home  exercise program in order to DC to home program.     Plan     Plan of care Certification: 10/2/2023 to 12/1/2023.    Outpatient Physical Therapy 2 times weekly for 8 weeks to include the following interventions: Electrical Stimulation PRN, Gait Training, Manual Therapy, Moist Heat/ Ice, Neuromuscular Re-ed, Patient Education, Therapeutic Activities, and Therapeutic Exercise.     Molly Buck, PT, DPT, OCS

## 2023-10-09 ENCOUNTER — TELEPHONE (OUTPATIENT)
Dept: SPORTS MEDICINE | Facility: CLINIC | Age: 53
End: 2023-10-09
Payer: COMMERCIAL

## 2023-10-16 ENCOUNTER — OFFICE VISIT (OUTPATIENT)
Dept: SPORTS MEDICINE | Facility: CLINIC | Age: 53
End: 2023-10-16
Payer: COMMERCIAL

## 2023-10-16 VITALS
BODY MASS INDEX: 29.99 KG/M2 | SYSTOLIC BLOOD PRESSURE: 117 MMHG | DIASTOLIC BLOOD PRESSURE: 75 MMHG | HEART RATE: 68 BPM | WEIGHT: 175.69 LBS | HEIGHT: 64 IN

## 2023-10-16 DIAGNOSIS — Z98.890 S/P MENISCECTOMY: Primary | ICD-10-CM

## 2023-10-16 PROCEDURE — 3044F PR MOST RECENT HEMOGLOBIN A1C LEVEL <7.0%: ICD-10-PCS | Mod: CPTII,S$GLB,, | Performed by: PHYSICIAN ASSISTANT

## 2023-10-16 PROCEDURE — 1159F MED LIST DOCD IN RCRD: CPT | Mod: CPTII,S$GLB,, | Performed by: PHYSICIAN ASSISTANT

## 2023-10-16 PROCEDURE — 99999 PR PBB SHADOW E&M-EST. PATIENT-LVL III: CPT | Mod: PBBFAC,,, | Performed by: PHYSICIAN ASSISTANT

## 2023-10-16 PROCEDURE — 3044F HG A1C LEVEL LT 7.0%: CPT | Mod: CPTII,S$GLB,, | Performed by: PHYSICIAN ASSISTANT

## 2023-10-16 PROCEDURE — 99024 POSTOP FOLLOW-UP VISIT: CPT | Mod: S$GLB,,, | Performed by: PHYSICIAN ASSISTANT

## 2023-10-16 PROCEDURE — 1160F RVW MEDS BY RX/DR IN RCRD: CPT | Mod: CPTII,S$GLB,, | Performed by: PHYSICIAN ASSISTANT

## 2023-10-16 PROCEDURE — 99999 PR PBB SHADOW E&M-EST. PATIENT-LVL III: ICD-10-PCS | Mod: PBBFAC,,, | Performed by: PHYSICIAN ASSISTANT

## 2023-10-16 PROCEDURE — 3074F PR MOST RECENT SYSTOLIC BLOOD PRESSURE < 130 MM HG: ICD-10-PCS | Mod: CPTII,S$GLB,, | Performed by: PHYSICIAN ASSISTANT

## 2023-10-16 PROCEDURE — 1159F PR MEDICATION LIST DOCUMENTED IN MEDICAL RECORD: ICD-10-PCS | Mod: CPTII,S$GLB,, | Performed by: PHYSICIAN ASSISTANT

## 2023-10-16 PROCEDURE — 3078F DIAST BP <80 MM HG: CPT | Mod: CPTII,S$GLB,, | Performed by: PHYSICIAN ASSISTANT

## 2023-10-16 PROCEDURE — 3074F SYST BP LT 130 MM HG: CPT | Mod: CPTII,S$GLB,, | Performed by: PHYSICIAN ASSISTANT

## 2023-10-16 PROCEDURE — 3078F PR MOST RECENT DIASTOLIC BLOOD PRESSURE < 80 MM HG: ICD-10-PCS | Mod: CPTII,S$GLB,, | Performed by: PHYSICIAN ASSISTANT

## 2023-10-16 PROCEDURE — 1160F PR REVIEW ALL MEDS BY PRESCRIBER/CLIN PHARMACIST DOCUMENTED: ICD-10-PCS | Mod: CPTII,S$GLB,, | Performed by: PHYSICIAN ASSISTANT

## 2023-10-16 PROCEDURE — 99024 PR POST-OP FOLLOW-UP VISIT: ICD-10-PCS | Mod: S$GLB,,, | Performed by: PHYSICIAN ASSISTANT

## 2023-10-16 NOTE — PROGRESS NOTES
S:Di Perkins presents for post-operative evaluation.     DATE OF PROCEDURE: 9/29/2023      PROCEDURE PERFORMED:   Left  1. knee arthroscopic partial medial meniscectomy (CPT 99552)   2. knee arthroscopic chondroplasty   3. knee arthroscopic partial synovectomy    Di Perkins reports to be doing well 2wk s/p the above mentioned procedure. Denies fevers, chills, night sweats, chest pain, difficulty breathing, calf pain or tenderness. Going to PT 2xWeek at the [facility], now transferred [facility] [place] [facility] Seeing good progress daily. Pain levels are improving. Has d/c'd pain medication. Completed Eliquis.    O: The incisions are healing well.  No signs of infection.  Sutures were removed. No significant pain or unusual tenderness. aROM knee 0-115. Quad firing but weak.    A/P: Incisions healing well. Ok to shower with incisions uncovered. No submerging at this point. Plan to follow the rehab plan as previously outlined. RTC in 4 weeks.     POSTOPERATIVE PLAN: Patient may weight bear as tolerates on crutches. Full range of motion to tolerance. Will start physical therapy right away. Eliquis 2.5 mg BID x 2 weeks for DVT prophylaxis given the patient's factor 5 Leiden history

## 2024-07-26 ENCOUNTER — HOSPITAL ENCOUNTER (OUTPATIENT)
Dept: RADIOLOGY | Facility: HOSPITAL | Age: 54
Discharge: HOME | End: 2024-07-26
Attending: STUDENT IN AN ORGANIZED HEALTH CARE EDUCATION/TRAINING PROGRAM
Payer: COMMERCIAL

## 2024-07-26 DIAGNOSIS — Z12.31 ENCOUNTER FOR SCREENING MAMMOGRAM FOR BREAST CANCER: ICD-10-CM

## 2024-07-26 PROCEDURE — 77063 BREAST TOMOSYNTHESIS BI: CPT | Mod: 26,,, | Performed by: RADIOLOGY

## 2024-07-26 PROCEDURE — 77063 BREAST TOMOSYNTHESIS BI: CPT | Mod: TC

## 2024-07-26 PROCEDURE — 77067 SCR MAMMO BI INCL CAD: CPT | Mod: 26,,, | Performed by: RADIOLOGY

## 2024-11-25 ENCOUNTER — NURSE TRIAGE (OUTPATIENT)
Dept: ADMINISTRATIVE | Facility: CLINIC | Age: 54
End: 2024-11-25
Payer: COMMERCIAL

## 2024-11-25 ENCOUNTER — LAB VISIT (OUTPATIENT)
Dept: LAB | Facility: HOSPITAL | Age: 54
End: 2024-11-25
Payer: COMMERCIAL

## 2024-11-25 ENCOUNTER — OFFICE VISIT (OUTPATIENT)
Dept: FAMILY MEDICINE | Facility: CLINIC | Age: 54
End: 2024-11-25
Payer: COMMERCIAL

## 2024-11-25 VITALS
HEART RATE: 84 BPM | HEIGHT: 64 IN | DIASTOLIC BLOOD PRESSURE: 74 MMHG | SYSTOLIC BLOOD PRESSURE: 118 MMHG | RESPIRATION RATE: 16 BRPM | BODY MASS INDEX: 29.43 KG/M2 | TEMPERATURE: 99 F | WEIGHT: 172.38 LBS | OXYGEN SATURATION: 98 %

## 2024-11-25 DIAGNOSIS — R06.83 SNORES: ICD-10-CM

## 2024-11-25 DIAGNOSIS — Z13.220 LIPID SCREENING: ICD-10-CM

## 2024-11-25 DIAGNOSIS — Z12.12 ENCOUNTER FOR COLORECTAL CANCER SCREENING: ICD-10-CM

## 2024-11-25 DIAGNOSIS — R51.9 NONINTRACTABLE HEADACHE, UNSPECIFIED CHRONICITY PATTERN, UNSPECIFIED HEADACHE TYPE: ICD-10-CM

## 2024-11-25 DIAGNOSIS — R09.81 NASAL SINUS CONGESTION: ICD-10-CM

## 2024-11-25 DIAGNOSIS — Z12.11 ENCOUNTER FOR COLORECTAL CANCER SCREENING: ICD-10-CM

## 2024-11-25 DIAGNOSIS — R03.0 ELEVATED BLOOD PRESSURE READING: ICD-10-CM

## 2024-11-25 DIAGNOSIS — R03.0 ELEVATED BLOOD PRESSURE READING: Primary | ICD-10-CM

## 2024-11-25 LAB
ALBUMIN SERPL BCP-MCNC: 3.9 G/DL (ref 3.5–5.2)
ALBUMIN SERPL BCP-MCNC: 3.9 G/DL (ref 3.5–5.2)
ALP SERPL-CCNC: 64 U/L (ref 40–150)
ALT SERPL W/O P-5'-P-CCNC: 28 U/L (ref 10–44)
ANION GAP SERPL CALC-SCNC: 10 MMOL/L (ref 8–16)
ANION GAP SERPL CALC-SCNC: 10 MMOL/L (ref 8–16)
AST SERPL-CCNC: 26 U/L (ref 10–40)
BILIRUB SERPL-MCNC: 0.3 MG/DL (ref 0.1–1)
BUN SERPL-MCNC: 11 MG/DL (ref 6–20)
BUN SERPL-MCNC: 11 MG/DL (ref 6–20)
CALCIUM SERPL-MCNC: 9.5 MG/DL (ref 8.7–10.5)
CALCIUM SERPL-MCNC: 9.5 MG/DL (ref 8.7–10.5)
CHLORIDE SERPL-SCNC: 105 MMOL/L (ref 95–110)
CHLORIDE SERPL-SCNC: 105 MMOL/L (ref 95–110)
CHOLEST SERPL-MCNC: 234 MG/DL (ref 120–199)
CHOLEST/HDLC SERPL: 2.8 {RATIO} (ref 2–5)
CO2 SERPL-SCNC: 26 MMOL/L (ref 23–29)
CO2 SERPL-SCNC: 26 MMOL/L (ref 23–29)
CREAT SERPL-MCNC: 0.7 MG/DL (ref 0.5–1.4)
CREAT SERPL-MCNC: 0.7 MG/DL (ref 0.5–1.4)
EST. GFR  (NO RACE VARIABLE): >60 ML/MIN/1.73 M^2
EST. GFR  (NO RACE VARIABLE): >60 ML/MIN/1.73 M^2
GLUCOSE SERPL-MCNC: 81 MG/DL (ref 70–110)
GLUCOSE SERPL-MCNC: 81 MG/DL (ref 70–110)
HDLC SERPL-MCNC: 84 MG/DL (ref 40–75)
HDLC SERPL: 35.9 % (ref 20–50)
LDLC SERPL CALC-MCNC: 136.4 MG/DL (ref 63–159)
NONHDLC SERPL-MCNC: 150 MG/DL
PHOSPHATE SERPL-MCNC: 3.7 MG/DL (ref 2.7–4.5)
POTASSIUM SERPL-SCNC: 4.5 MMOL/L (ref 3.5–5.1)
POTASSIUM SERPL-SCNC: 4.5 MMOL/L (ref 3.5–5.1)
PROT SERPL-MCNC: 7.1 G/DL (ref 6–8.4)
SODIUM SERPL-SCNC: 141 MMOL/L (ref 136–145)
SODIUM SERPL-SCNC: 141 MMOL/L (ref 136–145)
TRIGL SERPL-MCNC: 68 MG/DL (ref 30–150)

## 2024-11-25 PROCEDURE — 3074F SYST BP LT 130 MM HG: CPT | Mod: CPTII,S$GLB,,

## 2024-11-25 PROCEDURE — 99214 OFFICE O/P EST MOD 30 MIN: CPT | Mod: S$GLB,,,

## 2024-11-25 PROCEDURE — 99999 PR PBB SHADOW E&M-EST. PATIENT-LVL III: CPT | Mod: PBBFAC,,,

## 2024-11-25 PROCEDURE — 3078F DIAST BP <80 MM HG: CPT | Mod: CPTII,S$GLB,,

## 2024-11-25 PROCEDURE — 1160F RVW MEDS BY RX/DR IN RCRD: CPT | Mod: CPTII,S$GLB,,

## 2024-11-25 PROCEDURE — 1159F MED LIST DOCD IN RCRD: CPT | Mod: CPTII,S$GLB,,

## 2024-11-25 PROCEDURE — 80053 COMPREHEN METABOLIC PANEL: CPT

## 2024-11-25 PROCEDURE — 84100 ASSAY OF PHOSPHORUS: CPT

## 2024-11-25 PROCEDURE — 36415 COLL VENOUS BLD VENIPUNCTURE: CPT | Mod: PO

## 2024-11-25 PROCEDURE — 80061 LIPID PANEL: CPT

## 2024-11-25 PROCEDURE — 3008F BODY MASS INDEX DOCD: CPT | Mod: CPTII,S$GLB,,

## 2024-11-25 NOTE — TELEPHONE ENCOUNTER
Di Hernandez's  reports Di has elevated /113 at 0545. Slight headache. Current /111 (at 0927). Has been having really bad headaches. Has not been diagnosed with HTN. Advised pt per triage protocol to see a physician in office today. V/u. Appt scheduled for 1130 today at [facility].   Reason for Disposition   Patient wants to be seen    Additional Information   Negative: Sounds like a life-threatening emergency to the triager   Symptom is main concern (e.g., headache, chest pain)   Negative: Difficult to awaken or acting confused (e.g., disoriented, slurred speech)   Negative: Weakness of the face, arm or leg on one side of the body and new-onset   Negative: Numbness of the face, arm or leg on one side of the body and new-onset   Negative: Loss of speech or garbled speech and new-onset   Negative: Passed out (e.g., fainted, lost consciousness, blacked out and was not responding)   Negative: Sounds like a life-threatening emergency to the triager   Negative: Unable to walk without falling   Negative: Stiff neck (can't touch chin to chest)   Negative: Other family members (or people in same household) with headaches and possibility of carbon monoxide exposure   Negative: SEVERE headache, states 'worst headache' of life   Negative: SEVERE headache, sudden-onset (i.e., reaching maximum intensity within seconds to 1 hour)   Negative: Severe pain in one eye   Negative: Loss of vision or double vision (Exception: Same as previously diagnosed migraines.)   Negative: Patient sounds very sick or weak to the triager   Negative: Fever > 103 F (39.4 C)   Negative: Fever > 100 F (37.8 C) and has diabetes mellitus or a weak immune system (e.g., HIV positive, cancer chemotherapy, organ transplant, splenectomy, chronic steroids)   Negative: SEVERE headache (e.g., excruciating) and has had severe headaches before   Negative: SEVERE headache and not relieved by pain meds   Negative: SEVERE headache and vomiting    Negative: SEVERE headache and fever   Negative: New-onset headache and weak immune system (e.g., HIV positive, cancer chemo, splenectomy, organ transplant, chronic steroids)   Negative: Fever present > 3 days (72 hours)    Protocols used: Blood Pressure - High-A-OH, Headache-A-OH

## 2024-11-25 NOTE — PROGRESS NOTES
Health Maintenance Due   Topic     TETANUS VACCINE      Colorectal Cancer Screening  CONSULT WITH PCP    Shingles Vaccine (1 of 2) hx chickenpox ; inform pt can get vaccine at pharmacy.    Influenza Vaccine (1) PATIENT DECLINE     COVID-19 Vaccine (4 - 2024-25 season) PATIENT DECLINE

## 2024-11-25 NOTE — PROGRESS NOTES
"  HPI     Chief Complaint:  Chief Complaint   Patient presents with    Headache    Hypertension       Di Perkins is a 53 y.o. female with multiple medical diagnoses as listed in the medical history and problem list that presents for headache and hypertension     HPI   Accompanied by her   History of Present Illness    CHIEF COMPLAINT:  Di presents today for follow-up on elevated blood pressure readings at home.    HYPERTENSION:  She reports elevated blood pressure readings at home using a wrist cuff, with recent very high readings including 188/113 and another with a diastolic pressure of 120. She states her blood pressure typically runs around 148-150/88 at home on risk cuff from her . She denies any changes in medication or overnight activities that could explain the sudden increase. She recalls one elevated reading from last year (  based on review and Epic it was 141/78  ) but reports having normal blood pressure readings during previous doctor visits, including during a recent skin infection. She acknowledges a family history of hypertension.    HEADACHE AND STRESS:  She experienced a severe headache yesterday, describing it as a "bad headache" associated with feeling pressure in her head. The headache has since resolved. She reports significant recent stress, which she believes is a major contributing factor to her current symptoms. During periods of heightened stress, she describes feeling her experiencing headaches.    SLEEP ISSUES:  She reports getting less than 6 hours of sleep per night. She expresses concern about possible sleep apnea, describing episodes of sleep paralysis where she feels awake but unable to move, accompanied by a sensation of suffocation. She also reports recent onset of loud snoring, which is new for her. She denies a formal diagnosis of sleep apnea but acknowledges the need for further evaluation.    SINUS AND EAR CONCERNS:  She reports feeling congested " and experiencing ear pain and clogging, which she describes as similar to when she experiences allergies.    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 However, she admits to not engaging in regular structured exercise. She acknowledges the need to resume an exercise routine for cardiovascular benefits.    CAFFEINE INTAKE:  She reports consuming caffeinated beverages, particularly energy drinks. She has been advised to discontinue caffeine intake due to its potential negative effects on her heart and kidneys.                  Assessment & Plan     Evaluated elevated blood pressure readings, considering potential contributing factors  Assessed for possible sleep apnea and sinus problems based on reported symptoms  Considered cardiovascular health in context of family history of kidney issues potentially related to hypertension.   Inform patient that some symptoms can coincide with blood pressure readings/hypertension such as palpitations, sweating headaches other body aches or pains  Determined need for further evaluation of potential sleep apnea and ENT issues  Opted for home sleep study to assess for sleep apnea  Will monitor blood pressure with new cuff before considering referral to cardiology;  discussed with patient's     FOLLOW-UP:  - Follow up in December after completing sleep study and ENT appointment.           Problem List Items Addressed This Visit       Nonintractable headache    Elevated blood pressure reading - Primary    Relevant Orders    Comprehensive Metabolic Panel    RENAL FUNCTION PANEL    ESSENTIAL HYPERTENSION:  - Explained factors contributing to elevated blood pressure, including stress, diet, sleep, obesity, and other medical conditions.  - Di to increase water intake;  80 oz per day.  - Di to reduce salt intake.  - Di to engage in regular exercise.  - Di to minimize  stress.  - Di to discontinue consumption of energy drinks.  - Di to limit caffeine intake.  - Di to monitor blood pressure with new cuff and report readings at next visit.    DIETARY COUNSELING:  - Di to increase water intake.  - Di to reduce salt intake.  - Di to discontinue consumption of energy drinks.  - Di to limit caffeine intake.      GENERAL ADULT MEDICAL EXAMINATION:  - Educated on the importance of regular check-ups and preventive screenings, particularly given family history of kidney issues.  - Lab tests ordered to check kidney function.      Nasal sinus congestion    Relevant Orders    Ambulatory referral/consult to ENT  NASAL CONGESTION AND SINUS ISSUES:  - Referred to ENT for evaluation of sinus problems and possible allergy testing.    Snores    Relevant Orders    Home Sleep Study    OBSTRUCTIVE SLEEP APNEA:  - Discussed the connection between sleep apnea and blood pressure issues.  - Di to aim for at least 6 hours of sleep per night.  - Home sleep study ordered to assess for sleep apnea.      Encounter for colorectal cancer screening    Relevant Orders    Cologuard Screening (Multitarget Stool DNA)    COLORECTAL CANCER SCREENING:  - Provided information on the Cologuard test for colorectal cancer screening.  - Cologuard test ordered for colorectal cancer screening.      Lipid screening    Relevant Orders    Lipid Panel         --------------------------------------------      Health Maintenance:  Health Maintenance         Date Due Completion Date    TETANUS VACCINE Never done ---    Colorectal Cancer Screening Never done ---    Shingles Vaccine (1 of 2) Never done ---    Influenza Vaccine (1) Never done ---    COVID-19 Vaccine (4 - 2024-25 season) 09/01/2024 12/6/2021    Mammogram 07/26/2025 7/26/2024    Cervical Cancer Screening 07/13/2026 7/13/2021    Hemoglobin A1c (Diabetic Prevention Screening) 08/08/2026 8/8/2023    Lipid Panel 08/08/2028 8/8/2023     "RSV Vaccine (Age 60+ and Pregnant patients) (1 - 1-dose 75+ series) 12/18/2046 ---            Health maintenance reviewed and Colon cancer screening ordered    Follow Up:  Follow up if symptoms worsen or fail to improve.    Exam     Review of Systems:  (as noted above)  Review of Systems  ROS:  General: -fever, -chills, -fatigue, -weight gain, -weight loss  Eyes: -vision changes, -redness, -discharge  ENT: +ear pain, +nasal congestion, -sore throat  Cardiovascular: -chest pain, -palpitations, -lower extremity edema  Respiratory: -cough, -shortness of breath  Gastrointestinal: -abdominal pain, -nausea, -vomiting, -diarrhea, -constipation, -blood in stool  Genitourinary: -dysuria, -hematuria, -frequency  Musculoskeletal: -joint pain, -muscle pain  Skin: -rash, -lesion  Neurological: +headache, -dizziness, -numbness, -tingling  Psychiatric: -anxiety, -depression, +sleep difficulty  Endocrine: -excessive sweating     Physical Exam:   Physical Exam  Vitals:    11/25/24 1121   BP: 118/74   BP Location: Right arm   Patient Position: Sitting   Pulse: 84   Resp: 16   Temp: 98.5 °F (36.9 °C)   TempSrc: Oral   SpO2: 98%   Weight: 78.2 kg (172 lb 6.4 oz)   Height: 5' 4" (1.626 m)      Body mass index is 29.59 kg/m².    Physical Exam    General: No acute distress. Well-developed. Well-nourished.  Eyes: EOMI. Sclerae anicteric.  HENT: Normocephalic. Atraumatic. Nares patent. Moist oral mucosa.  Cardiovascular: Regular rate. Regular rhythm. No murmurs. No rubs. No gallops. Normal S1, S2.  Respiratory: Normal respiratory effort. Clear to auscultation bilaterally. No rales. No rhonchi. No wheezing.  Musculoskeletal: No  obvious deformity.  Extremities: No lower extremity edema.  Neurological: Alert & oriented x3. No slurred speech. Normal gait.  Psychiatric: Normal mood. Normal affect. Good insight. Good judgment.  Skin: Warm. Dry. No rash.           History     Past Medical History:  Past Medical History:   Diagnosis Date    Deep " vein thrombosis 2002    left forearm, due to Factor 5 Leiden    Factor 5 Leiden mutation, heterozygous        Past Surgical History:  Past Surgical History:   Procedure Laterality Date    ARTHROSCOPIC CHONDROPLASTY OF KNEE JOINT Left 9/29/2023    Procedure: ARTHROSCOPY, KNEE, WITH CHONDROPLASTY;  Surgeon: MAYELA Sanderson MD;  Location: [facility];  Service: Orthopedics;  Laterality: Left;    KNEE ARTHROSCOPY W/ MENISCECTOMY Left 9/29/2023    Procedure: ARTHROSCOPY, KNEE, WITH PARTIAL MEDIAL MENISCECTOMY;  Surgeon: MAYELA Sanderson MD;  Location: [facility];  Service: Orthopedics;  Laterality: Left;  0.2% Ropivacaine    UTERINE FIBROID SURGERY         Social History:  Social History     Socioeconomic History    Marital status: Other   Tobacco Use    Smoking status: Never    Smokeless tobacco: Never   Substance and Sexual Activity    Alcohol use: No    Drug use: No    Sexual activity: Yes     Partners: Male     Birth control/protection: None     Social Drivers of Health     Financial Resource Strain: Patient Declined (11/25/2024)    Overall Financial Resource Strain (CARDIA)     Difficulty of Paying Living Expenses: Patient declined   Food Insecurity: Patient Declined (11/25/2024)    Hunger Vital Sign     Worried About Running Out of Food in the Last Year: Patient declined     Ran Out of Food in the Last Year: Patient declined   Physical Activity: Unknown (11/25/2024)    Exercise Vital Sign     Days of Exercise per Week: 2 days   Stress: Stress Concern Present (11/25/2024)    [employer] [place] - Occupational Stress Questionnaire     Feeling of Stress : Rather much   Housing Stability: Unknown (11/25/2024)    Housing Stability Vital Sign     Unable to Pay for Housing in the Last Year: Patient declined       Family History:  Family History   Problem Relation Name Age of Onset    Diabetes Mother         Allergies and Medications: (updated and reviewed)  Review of patient's allergies indicates:  No  Known Allergies  No current outpatient medications on file.     No current facility-administered medications for this visit.       Patient Care Team:  Corrina Pal MD as PCP - General (Family Medicine)         - The patient is given an After Visit Summary that lists all medications with directions, allergies, education, orders placed during this encounter and follow-up instructions.      - I have reviewed the patient's medical information including past medical, family, and social history sections including the medications and allergies.      - We discussed the patient's current medications.     This note was created by combination of typed  and MModal dictation.  Transcription errors may be present.  If there are any questions, please contact me.     This note was generated with the assistance of ambient listening technology. Verbal consent was obtained by the patient and accompanying visitor(s) for the recording of patient appointment to facilitate this note. I attest to having reviewed and edited the generated note for accuracy, though some syntax or spelling errors may persist. Please contact the author of this note for any clarification.          Harleen Dover PA-C

## 2024-12-09 ENCOUNTER — TELEPHONE (OUTPATIENT)
Dept: PULMONOLOGY | Facility: HOSPITAL | Age: 54
End: 2024-12-09
Payer: COMMERCIAL

## 2024-12-13 ENCOUNTER — OFFICE VISIT (OUTPATIENT)
Dept: OTOLARYNGOLOGY | Facility: CLINIC | Age: 54
End: 2024-12-13
Payer: COMMERCIAL

## 2024-12-13 VITALS — BODY MASS INDEX: 30.18 KG/M2 | WEIGHT: 175.81 LBS

## 2024-12-13 DIAGNOSIS — J30.2 SEASONAL ALLERGIC RHINITIS, UNSPECIFIED TRIGGER: ICD-10-CM

## 2024-12-13 DIAGNOSIS — R09.81 NASAL SINUS CONGESTION: ICD-10-CM

## 2024-12-13 DIAGNOSIS — R06.83 SNORING: Primary | ICD-10-CM

## 2024-12-13 RX ORDER — FLUTICASONE PROPIONATE 50 MCG
2 SPRAY, SUSPENSION (ML) NASAL 2 TIMES DAILY
Qty: 18.2 ML | Refills: 3 | Status: SHIPPED | OUTPATIENT
Start: 2024-12-13

## 2024-12-13 RX ORDER — AZELASTINE 1 MG/ML
2 SPRAY, METERED NASAL 2 TIMES DAILY
Qty: 30 ML | Refills: 3 | Status: SHIPPED | OUTPATIENT
Start: 2024-12-13

## 2024-12-13 NOTE — PROGRESS NOTES
OTOLARYNGOLOGY CLINIC NOTE  Date:  12/13/2024     Chief complaint:  Chief Complaint   Patient presents with    Nasal Congestion     History of Present Illness  Di Perkins is a 53 y.o. female  presenting today for a new evaluation and treatment of nasal congestion. Pt reports she started snoring a few months ago.  Pt reports her  has told her she is very loud.  Pt reports she has waken up coughing and choking in the middle of her sleep.  Pt reports her PCP has ordered a sleep study which she will be doing next Monday.  Pt reports having occasional snoring and rhinitis.  Pt reports her ears have been feeling full.  Pt reports it feels like they need to be cleaned.  Pt denies any history of allergy problems and has never had allergy testing or shots.  Pt denies any facial surgeries or sinus surgery.  Pt denies anosmia and sore throat.  Pt has felt some burning to the back of her throat when she wakes up during the night.      Past Medical History  Past Medical History:   Diagnosis Date    Deep vein thrombosis 2002    left forearm, due to Factor 5 Leiden    Factor 5 Leiden mutation, heterozygous       Past Surgical History  Past Surgical History:   Procedure Laterality Date    ARTHROSCOPIC CHONDROPLASTY OF KNEE JOINT Left 9/29/2023    Procedure: ARTHROSCOPY, KNEE, WITH CHONDROPLASTY;  Surgeon: MAYELA Sanderson MD;  Location: [facility] OR;  Service: Orthopedics;  Laterality: Left;    KNEE ARTHROSCOPY W/ MENISCECTOMY Left 9/29/2023    Procedure: ARTHROSCOPY, KNEE, WITH PARTIAL MEDIAL MENISCECTOMY;  Surgeon: MAYELA Sanderson MD;  Location: [facility] OR;  Service: Orthopedics;  Laterality: Left;  0.2% Ropivacaine    UTERINE FIBROID SURGERY        Medications  No current outpatient medications on file prior to visit.     No current facility-administered medications on file prior to visit.     Review of Systems  Review of Systems   Constitutional:  Positive for malaise/fatigue.   HENT:  Positive for ear pain.     Cardiovascular:  Positive for chest pain.   Gastrointestinal:  Positive for heartburn.   Genitourinary: Negative.    Musculoskeletal:  Positive for back pain.   Skin: Negative.    Neurological:  Positive for headaches.   Psychiatric/Behavioral:  Positive for depression.       Social History   reports that she has never smoked. She has never used smokeless tobacco. She reports that she does not drink alcohol and does not use drugs.     Family History  Family History   Problem Relation Name Age of Onset    Diabetes Mother        Physical Exam   There were no vitals filed for this visit. Body mass index is 30.18 kg/m².  Weight: 79.8 kg (175 lb 13.1 oz)       GENERAL: no acute distress.  HEAD: normocephalic.   EYES: lids and lashes normal. No scleral icterus  EARS: external ear without lesion, normal pinna shape and position.  External auditory canal with normal cerumen, tympanic membrane fully visible, no perforation , no retraction. No middle ear effusion.   NOSE: external nose without significant bony abnormality; enlarged turbinates  ORAL CAVITY/OROPHARYNX: tongue midline and mobile. Symmetric palate rise.    NECK: trachea midline.   LYMPH NODES:No cervical lymphadenopathy.  RESPIRATORY: no stridor, no stertor. Voice normal. Respirations nonlabored.  NEURO: alert, responds to questions appropriately.   Cranial nerve exam as indicated in above sections and additionally showed facial movement symmetric with good eye closure and symmetric smile.   PSYCH:mood appropriate    Imaging:  The patient does not have any pertinent and/or recent imaging of the head and neck.     Labs:  CBC  Recent Labs   Lab 08/08/23  1630   WBC 5.76   Hemoglobin 12.2   Hematocrit 39.7   MCV 89   Platelets 192     BMP  Recent Labs   Lab 08/22/22  1057 08/08/23  1630 11/25/24  1205   Glucose  --  83 81  81   Sodium 140 140 141  141   Potassium 3.9 4.3 4.5  4.5   Chloride  --  105 105  105   CO2  --  28 26  26   Carbon Dioxide 28  --   --     BUN 12.0 12 11  11   Creatinine 0.57 0.8 0.7  0.7   Calcium 8.4 9.3 9.5  9.5   Phosphorus  --   --  3.7     Assessment  1. Nasal sinus congestion  - Ambulatory referral/consult to ENT    2. Snoring    3. Seasonal allergic rhinitis, unspecified trigger  - fluticasone propionate (FLONASE) 50 mcg/actuation nasal spray; 2 sprays (100 mcg total) by Each Nostril route 2 (two) times daily.  Dispense: 18.2 mL; Refill: 3  - azelastine (ASTELIN) 137 mcg (0.1 %) nasal spray; 2 sprays (274 mcg total) by Nasal route 2 (two) times daily.  Dispense: 30 mL; Refill: 3     Plan:  Snoring:  Pt advised of possible causes of her onset of snoring.  Pt reports she already has a sleep study ordered.  Pt advised to start nasal spray regimen and f/u in 2  months and prn.    Allergic rhinitis(AR):  discussed about pathophysiology of allergic disease and sinus disease. We discussed about treatment options for this including but not limited to medications and potential surgeries as well as when surgery is indicated.  - I recommend dual nasal spray therapy as combo of steroid and antihistamine nasal spray has been shown in evidence-based studies to be better than either alone.   -Discussed medication administration technique (point toward outer corner of eye and not towards nasal septum) and use nasal saline prior to medication sprays.   -We discussed importance of saline use prior to medication sprays to help sprays work better and to clean the nose.   -Counseled on risk of bleeding, risk of increased intraocular pressure/cataract with long term use.   -Discussed how to increase and decrease nasal spray regimen based on symptoms and that sprays can be used on prn basis but work best when used daily and take about 2-3 weeks to get to max level. If patient using sprays on a prn basis and symptoms not controlled should go back to daily /bid use  -discussed as well as gave patient physical documentation with photos about the saline and other  medication sprays (paperwork summarized discussion topics).  F/u 2 months and prn.  Discussed plan of care with patient in detail and all questions answered. Patient reported understanding of plan of care.

## 2024-12-13 NOTE — PATIENT INSTRUCTIONS
"Information and instructions from your visit with me today:    Start using the following medication nasal sprays:   Fluticasone spray:    This medication is a steroid spray. It stays within the nose and does not have absorption into the body that leads to side effects that one has with oral steroid medication. Fluticasone nasal spray is the same as the Flonase brand nasal spray. Discuss with your pharmacist if the price is lower over the counter or with a prescription ( this varies depending on insurance). The medication that is over the counter is the same as the prescription medication. Use this medication as instructed on the prescription, 1-2 sprays on each side of your nose twice daily.     Azelastine  spray:  This medication is an antihistamine used to treat nasal symptoms of allergy, which works specifically in the nose unlike antihistamine pills which have more of an effect on the whole body. Use this medication as instructed on the prescription, 1 spray on each side of your nose twice daily.     Additional instructions for medication sprays  Place the tip of the medication bottle in your nose and aim slightly up and out on each side to get medication high and deep into your nose and sinuses, and not have it all deposit in the very front of your nose. Aim the tip of the nozzle towards the outer corner of your eye . You can imagine aiming towards the back of your eyeball on each side for this, as opposed to straight back to the center of your nose and head.     You need to use this medication every day regardless of symptoms, as it takes time ( a few weeks) to work and get the benefits. It does not work on an "as needed" basis like taking a decongestant. If your symptoms only occur in a particular season, then the medication can be used seasonally instead of year long. For seasonal symptoms, you should start using the spray twice daily a month before when you normally have symptoms ( for example, if symptoms " start in August, should start at the end of June).     NASAL SALINE SPRAY ( simply saline and arm and hammer are examples) There are several different brands found in the cold and flu aisle of the pharmacy. You can use any brand of saline spray - this will deliver the saline by a gentle mist ( if you have difficulty or discomfort with nasal rinse/ a lot of fluid in the nose, this will be more comfortable).       Always rinse your nose with saline prior to using medication sprays and wait a couple of hours before using again. You can use the saline throughout the day to help with stuffy nose or dry nose.    Do not use nasal decongestant sprays such as Afrin or similar products long term ( over 3 days) .  This can cause long term physical nasal addiction. Afrin should only be used if having nose bleeds, severe nasal congestion , or severe ear pain/fullness and should not be used for more than 2-3 days in a row . It is a not a medication that should be used for a long period of time.     It was nice meeting you today, and I look forward to helping you feel better soon. Please don't hesitate to call if you have any other questions or concerns, or if I can be of any assistance in the meantime.          SHEBA Soto, FNP-C  Otorhinolaryngology

## 2025-03-13 ENCOUNTER — RESULTS FOLLOW-UP (OUTPATIENT)
Dept: FAMILY MEDICINE | Facility: CLINIC | Age: 55
End: 2025-03-13

## (undated) DEVICE — GOWN ECLIPSE REINF LV4 XLNG XL

## (undated) DEVICE — SOL NACL IRR 1000ML BTL

## (undated) DEVICE — PAD ABD 8X10 STERILE

## (undated) DEVICE — BANDAGE MATRIX HK LOOP 6IN 5YD

## (undated) DEVICE — TOURNIQUET SB QC DP 34X4IN

## (undated) DEVICE — DRAPE ARTHSCP FLD CTRL POUCH

## (undated) DEVICE — APPLICATOR CHLORAPREP ORN 26ML

## (undated) DEVICE — GLOVE SENSICARE PI SURG 8

## (undated) DEVICE — COVER CAMERA OPERATING ROOM

## (undated) DEVICE — DRAPE STERI INSTRUMENT 1018

## (undated) DEVICE — GAUZE SPONGE 4X4 12PLY

## (undated) DEVICE — DRESSING XEROFORM NONADH 1X8IN

## (undated) DEVICE — SUT 3-0 ETHILON 18 FS-1

## (undated) DEVICE — BLADE SHAVER LANZA 4.2X13CM

## (undated) DEVICE — PAD ABDOMINAL STERILE 8X10IN

## (undated) DEVICE — TUBE SET INFLOW/OUTFLOW

## (undated) DEVICE — BANDAGE ACE ELASTIC 6"

## (undated) DEVICE — DRESSING XEROFORM FOIL PK 1X8

## (undated) DEVICE — PAD CAST SPECIALIST STRL 6

## (undated) DEVICE — WRAP KNEE ACCU THERM GEL PACK

## (undated) DEVICE — CLOSURE SKIN STERI STRIP 1/2X4

## (undated) DEVICE — DRAPE STERI U-SHAPED 47X51IN

## (undated) DEVICE — SOL NACL IRR 3000ML

## (undated) DEVICE — GLOVE BIOGEL PI MICRO INDIC 7

## (undated) DEVICE — GLOVE SENSICARE PI GRN 8.5

## (undated) DEVICE — COVER LIGHT HANDLE 80/CA

## (undated) DEVICE — Device

## (undated) DEVICE — PROBE ARTHSCP EDGE ENERGY 50